# Patient Record
Sex: FEMALE | Race: WHITE | Employment: FULL TIME | URBAN - METROPOLITAN AREA
[De-identification: names, ages, dates, MRNs, and addresses within clinical notes are randomized per-mention and may not be internally consistent; named-entity substitution may affect disease eponyms.]

---

## 2024-08-31 ENCOUNTER — APPOINTMENT (OUTPATIENT)
Dept: GENERAL RADIOLOGY | Age: 49
DRG: 263 | End: 2024-08-31
Payer: MEDICAID

## 2024-08-31 ENCOUNTER — APPOINTMENT (OUTPATIENT)
Dept: CT IMAGING | Age: 49
DRG: 263 | End: 2024-08-31
Payer: MEDICAID

## 2024-08-31 ENCOUNTER — HOSPITAL ENCOUNTER (INPATIENT)
Age: 49
LOS: 7 days | Discharge: HOME | DRG: 263 | End: 2024-09-07
Attending: STUDENT IN AN ORGANIZED HEALTH CARE EDUCATION/TRAINING PROGRAM | Admitting: INTERNAL MEDICINE
Payer: MEDICAID

## 2024-08-31 ENCOUNTER — APPOINTMENT (OUTPATIENT)
Dept: ULTRASOUND IMAGING | Age: 49
DRG: 263 | End: 2024-08-31
Payer: MEDICAID
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
## 2024-08-31 DIAGNOSIS — K80.50 CHOLEDOCHOLITHIASIS: Primary | ICD-10-CM
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
## 2024-08-31 DIAGNOSIS — N17.9 AKI (ACUTE KIDNEY INJURY) (HCC): ICD-10-CM

## 2024-08-31 DIAGNOSIS — M62.82 NON-TRAUMATIC RHABDOMYOLYSIS: ICD-10-CM

## 2024-08-31 DIAGNOSIS — R74.01 TRANSAMINITIS: ICD-10-CM

## 2024-08-31 PROBLEM — E03.9 HYPOTHYROIDISM: Status: ACTIVE | Noted: 2024-08-31

## 2024-08-31 PROBLEM — R56.9 SEIZURE (HCC): Status: ACTIVE | Noted: 2024-08-31

## 2024-08-31 PROBLEM — R79.89 ELEVATED LFTS: Status: ACTIVE | Noted: 2024-08-31

## 2024-08-31 PROBLEM — R74.8 ELEVATED CK: Status: ACTIVE | Noted: 2024-08-31

## 2024-08-31 LAB
ALBUMIN SERPL-MCNC: 4.2 G/DL (ref 3.5–5.2)
ALP SERPL-CCNC: 264 U/L (ref 35–104)
ALT SERPL-CCNC: 264 U/L (ref 0–32)
AMMONIA PLAS-SCNC: 16 UMOL/L (ref 11–51)
AMPHET UR QL SCN: NEGATIVE
ANION GAP SERPL CALCULATED.3IONS-SCNC: 17 MMOL/L (ref 7–16)
APAP SERPL-MCNC: <5 UG/ML (ref 10–30)
AST SERPL-CCNC: 197 U/L (ref 0–31)
BACTERIA URNS QL MICRO: ABNORMAL
BARBITURATES UR QL SCN: NEGATIVE
BASOPHILS # BLD: 0.13 K/UL (ref 0–0.2)
BASOPHILS NFR BLD: 1 % (ref 0–2)
BENZODIAZ UR QL: NEGATIVE
BILIRUB SERPL-MCNC: 0.9 MG/DL (ref 0–1.2)
BILIRUB UR QL STRIP: ABNORMAL
BUN SERPL-MCNC: 21 MG/DL (ref 6–20)
BUPRENORPHINE UR QL: NEGATIVE
CALCIUM SERPL-MCNC: 10.4 MG/DL (ref 8.6–10.2)
CANNABINOIDS UR QL SCN: NEGATIVE
CHLORIDE SERPL-SCNC: 113 MMOL/L (ref 98–107)
CK SERPL-CCNC: ABNORMAL U/L (ref 20–180)
CLARITY UR: CLEAR
CO2 SERPL-SCNC: 18 MMOL/L (ref 22–29)
COCAINE UR QL SCN: NEGATIVE
COLOR UR: YELLOW
CREAT SERPL-MCNC: 1.9 MG/DL (ref 0.5–1)
CREAT UR-MCNC: 126.2 MG/DL (ref 29–226)
EOSINOPHIL # BLD: 0 K/UL (ref 0.05–0.5)
EOSINOPHILS RELATIVE PERCENT: 0 % (ref 0–6)
ERYTHROCYTE [DISTWIDTH] IN BLOOD BY AUTOMATED COUNT: 14.2 % (ref 11.5–15)
ETHANOLAMINE SERPL-MCNC: <10 MG/DL (ref 0–0.08)
FENTANYL UR QL: NEGATIVE
GFR, ESTIMATED: 33 ML/MIN/1.73M2
GLUCOSE SERPL-MCNC: 114 MG/DL (ref 74–99)
GLUCOSE UR STRIP-MCNC: NEGATIVE MG/DL
HCT VFR BLD AUTO: 38.3 % (ref 34–48)
HGB BLD-MCNC: 12.6 G/DL (ref 11.5–15.5)
HGB UR QL STRIP.AUTO: ABNORMAL
IMM GRANULOCYTES # BLD AUTO: 0.07 K/UL (ref 0–0.58)
IMM GRANULOCYTES NFR BLD: 0 % (ref 0–5)
INR PPP: 1.3
KETONES UR STRIP-MCNC: 15 MG/DL
LACTATE BLDV-SCNC: 1.1 MMOL/L (ref 0.5–2.2)
LEUKOCYTE ESTERASE UR QL STRIP: NEGATIVE
LYMPHOCYTES NFR BLD: 1.46 K/UL (ref 1.5–4)
LYMPHOCYTES RELATIVE PERCENT: 8 % (ref 20–42)
MAGNESIUM SERPL-MCNC: 2.9 MG/DL (ref 1.6–2.6)
MCH RBC QN AUTO: 31.3 PG (ref 26–35)
MCHC RBC AUTO-ENTMCNC: 32.9 G/DL (ref 32–34.5)
MCV RBC AUTO: 95 FL (ref 80–99.9)
METHADONE UR QL: NEGATIVE
MONOCYTES NFR BLD: 1.22 K/UL (ref 0.1–0.95)
MONOCYTES NFR BLD: 7 % (ref 2–12)
NEUTROPHILS NFR BLD: 84 % (ref 43–80)
NEUTS SEG NFR BLD: 15.44 K/UL (ref 1.8–7.3)
NITRITE UR QL STRIP: NEGATIVE
OPIATES UR QL SCN: NEGATIVE
OXYCODONE UR QL SCN: NEGATIVE
PARTIAL THROMBOPLASTIN TIME: 31 SEC (ref 24.5–35.1)
PCP UR QL SCN: NEGATIVE
PH UR STRIP: 6 [PH] (ref 5–9)
PLATELET # BLD AUTO: 289 K/UL (ref 130–450)
PMV BLD AUTO: 11.6 FL (ref 7–12)
POTASSIUM SERPL-SCNC: 3.8 MMOL/L (ref 3.5–5)
PROT SERPL-MCNC: 7.7 G/DL (ref 6.4–8.3)
PROT UR STRIP-MCNC: ABNORMAL MG/DL
PROTHROMBIN TIME: 13.8 SEC (ref 9.3–12.4)
RBC # BLD AUTO: 4.03 M/UL (ref 3.5–5.5)
RBC #/AREA URNS HPF: ABNORMAL /HPF
SALICYLATES SERPL-MCNC: 6.8 MG/DL (ref 0–30)
SODIUM SERPL-SCNC: 148 MMOL/L (ref 132–146)
SODIUM UR-SCNC: 47 MMOL/L
SP GR UR STRIP: >1.03 (ref 1–1.03)
TEST INFORMATION: NORMAL
TOXIC TRICYCLIC SC,BLOOD: NEGATIVE
TROPONIN I SERPL HS-MCNC: 19 NG/L (ref 0–9)
UROBILINOGEN UR STRIP-ACNC: 0.2 EU/DL (ref 0–1)
WBC #/AREA URNS HPF: ABNORMAL /HPF
WBC OTHER # BLD: 18.3 K/UL (ref 4.5–11.5)

## 2024-08-31 PROCEDURE — 2580000003 HC RX 258: Performed by: STUDENT IN AN ORGANIZED HEALTH CARE EDUCATION/TRAINING PROGRAM

## 2024-08-31 PROCEDURE — 6360000004 HC RX CONTRAST MEDICATION: Performed by: RADIOLOGY

## 2024-08-31 PROCEDURE — 74177 CT ABD & PELVIS W/CONTRAST: CPT

## 2024-08-31 PROCEDURE — 73090 X-RAY EXAM OF FOREARM: CPT

## 2024-08-31 PROCEDURE — 87040 BLOOD CULTURE FOR BACTERIA: CPT

## 2024-08-31 PROCEDURE — 70486 CT MAXILLOFACIAL W/O DYE: CPT

## 2024-08-31 PROCEDURE — 70450 CT HEAD/BRAIN W/O DYE: CPT

## 2024-08-31 PROCEDURE — 85730 THROMBOPLASTIN TIME PARTIAL: CPT

## 2024-08-31 PROCEDURE — 76705 ECHO EXAM OF ABDOMEN: CPT

## 2024-08-31 PROCEDURE — 99223 1ST HOSP IP/OBS HIGH 75: CPT | Performed by: INTERNAL MEDICINE

## 2024-08-31 PROCEDURE — 82140 ASSAY OF AMMONIA: CPT

## 2024-08-31 PROCEDURE — 6360000002 HC RX W HCPCS: Performed by: STUDENT IN AN ORGANIZED HEALTH CARE EDUCATION/TRAINING PROGRAM

## 2024-08-31 PROCEDURE — G0480 DRUG TEST DEF 1-7 CLASSES: HCPCS

## 2024-08-31 PROCEDURE — 83735 ASSAY OF MAGNESIUM: CPT

## 2024-08-31 PROCEDURE — 2580000003 HC RX 258

## 2024-08-31 PROCEDURE — 99285 EMERGENCY DEPT VISIT HI MDM: CPT

## 2024-08-31 PROCEDURE — 84300 ASSAY OF URINE SODIUM: CPT

## 2024-08-31 PROCEDURE — 73521 X-RAY EXAM HIPS BI 2 VIEWS: CPT

## 2024-08-31 PROCEDURE — 71275 CT ANGIOGRAPHY CHEST: CPT

## 2024-08-31 PROCEDURE — 87086 URINE CULTURE/COLONY COUNT: CPT

## 2024-08-31 PROCEDURE — 84484 ASSAY OF TROPONIN QUANT: CPT

## 2024-08-31 PROCEDURE — 80053 COMPREHEN METABOLIC PANEL: CPT

## 2024-08-31 PROCEDURE — 73562 X-RAY EXAM OF KNEE 3: CPT

## 2024-08-31 PROCEDURE — 96374 THER/PROPH/DIAG INJ IV PUSH: CPT

## 2024-08-31 PROCEDURE — 85610 PROTHROMBIN TIME: CPT

## 2024-08-31 PROCEDURE — 80143 DRUG ASSAY ACETAMINOPHEN: CPT

## 2024-08-31 PROCEDURE — 2060000000 HC ICU INTERMEDIATE R&B

## 2024-08-31 PROCEDURE — 81001 URINALYSIS AUTO W/SCOPE: CPT

## 2024-08-31 PROCEDURE — 73610 X-RAY EXAM OF ANKLE: CPT

## 2024-08-31 PROCEDURE — 80179 DRUG ASSAY SALICYLATE: CPT

## 2024-08-31 PROCEDURE — 73620 X-RAY EXAM OF FOOT: CPT

## 2024-08-31 PROCEDURE — 36415 COLL VENOUS BLD VENIPUNCTURE: CPT

## 2024-08-31 PROCEDURE — 82550 ASSAY OF CK (CPK): CPT

## 2024-08-31 PROCEDURE — 83605 ASSAY OF LACTIC ACID: CPT

## 2024-08-31 PROCEDURE — 85025 COMPLETE CBC W/AUTO DIFF WBC: CPT

## 2024-08-31 PROCEDURE — 72125 CT NECK SPINE W/O DYE: CPT

## 2024-08-31 PROCEDURE — 82570 ASSAY OF URINE CREATININE: CPT

## 2024-08-31 PROCEDURE — 80307 DRUG TEST PRSMV CHEM ANLYZR: CPT

## 2024-08-31 PROCEDURE — 73630 X-RAY EXAM OF FOOT: CPT

## 2024-08-31 RX ORDER — DOCUSATE SODIUM 100 MG/1
100 CAPSULE, LIQUID FILLED ORAL 2 TIMES DAILY
Status: DISCONTINUED | OUTPATIENT
Start: 2024-09-01 | End: 2024-09-07 | Stop reason: HOSPADM

## 2024-08-31 RX ORDER — HYDRALAZINE HYDROCHLORIDE 20 MG/ML
10 INJECTION INTRAMUSCULAR; INTRAVENOUS EVERY 6 HOURS PRN
Status: DISCONTINUED | OUTPATIENT
Start: 2024-08-31 | End: 2024-09-07 | Stop reason: HOSPADM

## 2024-08-31 RX ORDER — POTASSIUM CHLORIDE 7.45 MG/ML
10 INJECTION INTRAVENOUS PRN
Status: DISCONTINUED | OUTPATIENT
Start: 2024-08-31 | End: 2024-09-07 | Stop reason: HOSPADM

## 2024-08-31 RX ORDER — CALCIUM CARBONATE 500 MG/1
500 TABLET, CHEWABLE ORAL 3 TIMES DAILY PRN
Status: DISCONTINUED | OUTPATIENT
Start: 2024-08-31 | End: 2024-09-07 | Stop reason: HOSPADM

## 2024-08-31 RX ORDER — ACETAMINOPHEN 650 MG/1
650 SUPPOSITORY RECTAL EVERY 6 HOURS PRN
Status: DISCONTINUED | OUTPATIENT
Start: 2024-08-31 | End: 2024-09-07 | Stop reason: HOSPADM

## 2024-08-31 RX ORDER — MAGNESIUM SULFATE IN WATER 40 MG/ML
2000 INJECTION, SOLUTION INTRAVENOUS PRN
Status: DISCONTINUED | OUTPATIENT
Start: 2024-08-31 | End: 2024-09-07 | Stop reason: HOSPADM

## 2024-08-31 RX ORDER — ACETAMINOPHEN 325 MG/1
650 TABLET ORAL EVERY 6 HOURS PRN
Status: DISCONTINUED | OUTPATIENT
Start: 2024-08-31 | End: 2024-09-07 | Stop reason: HOSPADM

## 2024-08-31 RX ORDER — FENTANYL CITRATE 50 UG/ML
25 INJECTION, SOLUTION INTRAMUSCULAR; INTRAVENOUS ONCE
Status: COMPLETED | OUTPATIENT
Start: 2024-08-31 | End: 2024-09-01

## 2024-08-31 RX ORDER — PREGABALIN 50 MG/1
100 CAPSULE ORAL 2 TIMES DAILY
Status: DISCONTINUED | OUTPATIENT
Start: 2024-09-01 | End: 2024-09-07 | Stop reason: HOSPADM

## 2024-08-31 RX ORDER — SODIUM CHLORIDE 0.9 % (FLUSH) 0.9 %
5-40 SYRINGE (ML) INJECTION EVERY 12 HOURS SCHEDULED
Status: DISCONTINUED | OUTPATIENT
Start: 2024-08-31 | End: 2024-09-07 | Stop reason: HOSPADM

## 2024-08-31 RX ORDER — ENOXAPARIN SODIUM 100 MG/ML
40 INJECTION SUBCUTANEOUS DAILY
Status: DISCONTINUED | OUTPATIENT
Start: 2024-09-01 | End: 2024-09-05

## 2024-08-31 RX ORDER — LEVETIRACETAM 500 MG/5ML
1500 INJECTION, SOLUTION, CONCENTRATE INTRAVENOUS ONCE
Status: COMPLETED | OUTPATIENT
Start: 2024-08-31 | End: 2024-08-31

## 2024-08-31 RX ORDER — LEVETIRACETAM 500 MG/5ML
500 INJECTION, SOLUTION, CONCENTRATE INTRAVENOUS EVERY 12 HOURS
Status: DISCONTINUED | OUTPATIENT
Start: 2024-09-01 | End: 2024-09-07 | Stop reason: HOSPADM

## 2024-08-31 RX ORDER — BENZONATATE 100 MG/1
100 CAPSULE ORAL 3 TIMES DAILY PRN
Status: DISCONTINUED | OUTPATIENT
Start: 2024-08-31 | End: 2024-09-07 | Stop reason: HOSPADM

## 2024-08-31 RX ORDER — LEVOTHYROXINE SODIUM 25 UG/1
50 TABLET ORAL DAILY
Status: DISCONTINUED | OUTPATIENT
Start: 2024-09-01 | End: 2024-09-07 | Stop reason: HOSPADM

## 2024-08-31 RX ORDER — ONDANSETRON 2 MG/ML
4 INJECTION INTRAMUSCULAR; INTRAVENOUS EVERY 6 HOURS PRN
Status: DISCONTINUED | OUTPATIENT
Start: 2024-08-31 | End: 2024-09-07 | Stop reason: HOSPADM

## 2024-08-31 RX ORDER — LANOLIN ALCOHOL/MO/W.PET/CERES
3 CREAM (GRAM) TOPICAL NIGHTLY PRN
Status: DISCONTINUED | OUTPATIENT
Start: 2024-09-01 | End: 2024-08-31

## 2024-08-31 RX ORDER — CLONIDINE HYDROCHLORIDE 0.1 MG/1
0.2 TABLET ORAL NIGHTLY
Status: DISCONTINUED | OUTPATIENT
Start: 2024-09-01 | End: 2024-09-07 | Stop reason: HOSPADM

## 2024-08-31 RX ORDER — CLONIDINE HYDROCHLORIDE 0.1 MG/1
0.2 TABLET ORAL NIGHTLY
Status: DISCONTINUED | OUTPATIENT
Start: 2024-08-31 | End: 2024-08-31

## 2024-08-31 RX ORDER — BUPROPION HYDROCHLORIDE 100 MG/1
100 TABLET, EXTENDED RELEASE ORAL 2 TIMES DAILY
Status: DISCONTINUED | OUTPATIENT
Start: 2024-09-01 | End: 2024-09-06

## 2024-08-31 RX ORDER — 0.9 % SODIUM CHLORIDE 0.9 %
1000 INTRAVENOUS SOLUTION INTRAVENOUS ONCE
Status: COMPLETED | OUTPATIENT
Start: 2024-08-31 | End: 2024-08-31

## 2024-08-31 RX ORDER — POLYETHYLENE GLYCOL 3350 17 G/17G
17 POWDER, FOR SOLUTION ORAL DAILY
Status: DISCONTINUED | OUTPATIENT
Start: 2024-09-01 | End: 2024-09-07 | Stop reason: HOSPADM

## 2024-08-31 RX ORDER — SODIUM CHLORIDE 0.9 % (FLUSH) 0.9 %
5-40 SYRINGE (ML) INJECTION PRN
Status: DISCONTINUED | OUTPATIENT
Start: 2024-08-31 | End: 2024-09-07 | Stop reason: HOSPADM

## 2024-08-31 RX ORDER — SODIUM CHLORIDE 9 MG/ML
INJECTION, SOLUTION INTRAVENOUS PRN
Status: DISCONTINUED | OUTPATIENT
Start: 2024-08-31 | End: 2024-09-07 | Stop reason: HOSPADM

## 2024-08-31 RX ORDER — TOPIRAMATE 100 MG/1
200 TABLET, FILM COATED ORAL NIGHTLY
Status: DISCONTINUED | OUTPATIENT
Start: 2024-09-01 | End: 2024-09-01

## 2024-08-31 RX ORDER — ONDANSETRON 4 MG/1
4 TABLET, ORALLY DISINTEGRATING ORAL EVERY 8 HOURS PRN
Status: DISCONTINUED | OUTPATIENT
Start: 2024-08-31 | End: 2024-09-07 | Stop reason: HOSPADM

## 2024-08-31 RX ORDER — POLYETHYLENE GLYCOL 3350 17 G/17G
17 POWDER, FOR SOLUTION ORAL DAILY PRN
Status: DISCONTINUED | OUTPATIENT
Start: 2024-08-31 | End: 2024-09-05

## 2024-08-31 RX ORDER — SODIUM CHLORIDE, SODIUM LACTATE, POTASSIUM CHLORIDE, CALCIUM CHLORIDE 600; 310; 30; 20 MG/100ML; MG/100ML; MG/100ML; MG/100ML
INJECTION, SOLUTION INTRAVENOUS CONTINUOUS
Status: DISCONTINUED | OUTPATIENT
Start: 2024-08-31 | End: 2024-09-01

## 2024-08-31 RX ORDER — IOPAMIDOL 755 MG/ML
75 INJECTION, SOLUTION INTRAVASCULAR
Status: COMPLETED | OUTPATIENT
Start: 2024-08-31 | End: 2024-08-31

## 2024-08-31 RX ORDER — DULOXETIN HYDROCHLORIDE 30 MG/1
60 CAPSULE, DELAYED RELEASE ORAL DAILY
Status: DISCONTINUED | OUTPATIENT
Start: 2024-09-01 | End: 2024-09-07 | Stop reason: HOSPADM

## 2024-08-31 RX ORDER — POTASSIUM CHLORIDE 1500 MG/1
40 TABLET, EXTENDED RELEASE ORAL PRN
Status: DISCONTINUED | OUTPATIENT
Start: 2024-08-31 | End: 2024-09-07 | Stop reason: HOSPADM

## 2024-08-31 RX ADMIN — SODIUM CHLORIDE 1000 ML: 9 INJECTION, SOLUTION INTRAVENOUS at 19:32

## 2024-08-31 RX ADMIN — LEVETIRACETAM 1500 MG: 100 INJECTION INTRAVENOUS at 21:55

## 2024-08-31 RX ADMIN — SODIUM CHLORIDE, POTASSIUM CHLORIDE, SODIUM LACTATE AND CALCIUM CHLORIDE: 600; 310; 30; 20 INJECTION, SOLUTION INTRAVENOUS at 22:02

## 2024-08-31 RX ADMIN — IOPAMIDOL 75 ML: 755 INJECTION, SOLUTION INTRAVENOUS at 21:18

## 2024-08-31 NOTE — ED PROVIDER NOTES
[facility] IMCU/NEURO  EMERGENCY DEPARTMENT ENCOUNTER        Pt Name: Natasha Joe  MRN: [id number]  Birthdate 1975  Date of evaluation: 8/31/2024  Provider: Gertrude Khalil DO  PCP: No primary care provider on file.  Note Started: 6:55 PM EDT 8/31/24    CHIEF COMPLAINT       Chief Complaint   Patient presents with    Fall     Fall at home in shower. Found at 1800 by boyfriend. Multiple bruises. On legs and arms. Bruise on Lt eye. Pt ANO x 3 at this moment.        HISTORY OF PRESENT ILLNESS: 1 or more Elements   History From: Patient    Limitations to history : None  Social Determinants : None    Natasha Joe is a 49 y.o. female who presents for fall.  Patient was found by her boyfriend around 6:00 tonight.  She states that she is unsure whether she lost consciousness or had a seizure prior to falling.  She was found in the shower.  She is unsure whether she hit her head.  She is complaining of pain in the neck.  She denies any drug or alcohol use.  She does have history of seizures.  She is not on anything for seizures.  Per EMS she was having intermittent confusion.  Denies any fever, chills, n/v, headache, dizziness, vision changes, weakness, cp, palpitations, leg swelling/tenderness, sob, cough, abd pain, dysuria, hematuria, diarrhea, constipation, bloody stools.    Nursing Notes were all reviewed and agreed with or any disagreements were addressed in the HPI.    ROS:   Pertinent positives and negatives are stated within HPI, all other systems reviewed and are negative.      --------------------------------------------- PAST HISTORY ---------------------------------------------  Past Medical History:  has a past medical history of History of heart valve regurgitation and Seizure (HCC).    Past Surgical History:  has no past surgical history on file.    Social History:      Family History: family history is not on file.     The patient’s home medications have been reviewed.    Allergies: Patient  patient's labs and case and he stated to start her on LR at 175 cc an hour.  He states that he will put in orders for urine studies.  He also states that a Ruffin catheter would be indicated for monitoring urine output. [AD]   2154 Patient CT abdomen pelvis shows findings of a 7 mm common bile duct stone distally, correlated clinically she has no abdominal pain but given these findings and her elevated LFTs we will obtain a right upper quadrant ultrasound. [VG]   2202 Spoke with Dr. Vela who accepted the patient for admission [AD]   2216 Reevaluated patient, all of her compartments were soft to palpation and pulses were intact in all extremities. [AD]      ED Course User Index  [AD] Gertrude Khalil DO  [VG] Thi Loyd DO       Medications   lactated ringers IV soln infusion ( IntraVENous New Bag 8/31/24 2202)   buPROPion (WELLBUTRIN SR) extended release tablet 100 mg (has no administration in time range)   DULoxetine (CYMBALTA) extended release capsule 60 mg (has no administration in time range)   levothyroxine (SYNTHROID) tablet 50 mcg (has no administration in time range)   pregabalin (LYRICA) capsule 100 mg (has no administration in time range)   topiramate (TOPAMAX) tablet 200 mg (has no administration in time range)   levETIRAcetam (KEPPRA) injection 500 mg (has no administration in time range)   benzocaine-menthol (CEPACOL SORE THROAT) lozenge 1 lozenge (has no administration in time range)   benzonatate (TESSALON) capsule 100 mg (has no administration in time range)   calcium carbonate (TUMS) chewable tablet 500 mg (has no administration in time range)   hydrALAZINE (APRESOLINE) injection 10 mg (has no administration in time range)   Polyvinyl Alcohol-Povidone PF (REFRESH) 1.4-0.6 % ophthalmic solution 1 drop (has no administration in time range)   sodium chloride (OCEAN, BABY AYR) 0.65 % nasal spray 1 spray (has no administration in time range)   sodium chloride flush 0.9 % injection 5-40 mL (has

## 2024-08-31 NOTE — ED NOTES
Radiology Procedure Waiver   Name: Natasha Joe  : 1975  MRN: [id number]    Date:  24    Time: 6:54 PM EDT    Benefits of immediately proceeding with Radiology exam(s) without pre-testing outweigh the risks or are not indicated as specified below and therefore the following is/are being waived:    [x] Pregnancy test   [] Patients LMP on-time and regular.   [] Patient had Tubal Ligation or has other Contraception Device.   [] Patient  is Menopausal or Premenarcheal.    [] Patient had Full or Partial Hysterectomy.    [] Protocol for Iodine allergy    [] MRI Questionnaire     [x] BUN/Creatinine   [] Patient age w/no hx of renal dysfunction.   [] Patient on Dialysis.   [] Recent Normal Labs.  Electronically signed by Gertrude Khalil DO on 24 at 6:54 PM EDT

## 2024-09-01 PROBLEM — M62.82 RHABDOMYOLYSIS: Status: ACTIVE | Noted: 2024-09-01
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
## 2024-09-01 LAB
25(OH)D3 SERPL-MCNC: 34.9 NG/ML (ref 30–100)
ALBUMIN SERPL-MCNC: 3.7 G/DL (ref 3.5–5.2)
ALP SERPL-CCNC: 212 U/L (ref 35–104)
ALT SERPL-CCNC: 231 U/L (ref 0–32)
ANION GAP SERPL CALCULATED.3IONS-SCNC: 10 MMOL/L (ref 7–16)
ANION GAP SERPL CALCULATED.3IONS-SCNC: 15 MMOL/L (ref 7–16)
AST SERPL-CCNC: 240 U/L (ref 0–31)
BACTERIA URNS QL MICRO: ABNORMAL
BILIRUB SERPL-MCNC: 0.9 MG/DL (ref 0–1.2)
BILIRUB UR QL STRIP: ABNORMAL
BUN SERPL-MCNC: 21 MG/DL (ref 6–20)
BUN SERPL-MCNC: 22 MG/DL (ref 6–20)
CALCIUM SERPL-MCNC: 9.3 MG/DL (ref 8.6–10.2)
CALCIUM SERPL-MCNC: 9.6 MG/DL (ref 8.6–10.2)
CHLORIDE SERPL-SCNC: 114 MMOL/L (ref 98–107)
CHLORIDE SERPL-SCNC: 118 MMOL/L (ref 98–107)
CHLORIDE UR-SCNC: 74 MMOL/L
CHOLEST SERPL-MCNC: 182 MG/DL
CK SERPL-CCNC: ABNORMAL U/L (ref 20–180)
CLARITY UR: CLEAR
CO2 SERPL-SCNC: 17 MMOL/L (ref 22–29)
CO2 SERPL-SCNC: 21 MMOL/L (ref 22–29)
COLOR UR: YELLOW
CREAT SERPL-MCNC: 1.1 MG/DL (ref 0.5–1)
CREAT SERPL-MCNC: 1.4 MG/DL (ref 0.5–1)
ERYTHROCYTE [DISTWIDTH] IN BLOOD BY AUTOMATED COUNT: 14.2 % (ref 11.5–15)
GFR, ESTIMATED: 46 ML/MIN/1.73M2
GFR, ESTIMATED: 64 ML/MIN/1.73M2
GLUCOSE SERPL-MCNC: 169 MG/DL (ref 74–99)
GLUCOSE SERPL-MCNC: 91 MG/DL (ref 74–99)
GLUCOSE UR STRIP-MCNC: NEGATIVE MG/DL
HBA1C MFR BLD: 4.7 % (ref 4–5.6)
HCT VFR BLD AUTO: 34.3 % (ref 34–48)
HDLC SERPL-MCNC: 39 MG/DL
HGB BLD-MCNC: 11.4 G/DL (ref 11.5–15.5)
HGB UR QL STRIP.AUTO: ABNORMAL
KETONES UR STRIP-MCNC: 40 MG/DL
LDLC SERPL CALC-MCNC: 117 MG/DL
LEUKOCYTE ESTERASE UR QL STRIP: NEGATIVE
MAGNESIUM SERPL-MCNC: 2.5 MG/DL (ref 1.6–2.6)
MCH RBC QN AUTO: 32.1 PG (ref 26–35)
MCHC RBC AUTO-ENTMCNC: 33.2 G/DL (ref 32–34.5)
MCV RBC AUTO: 96.6 FL (ref 80–99.9)
NITRITE UR QL STRIP: NEGATIVE
PH UR STRIP: 6 [PH] (ref 5–9)
PHOSPHATE SERPL-MCNC: 3.1 MG/DL (ref 2.5–4.5)
PLATELET # BLD AUTO: 291 K/UL (ref 130–450)
PMV BLD AUTO: 11.9 FL (ref 7–12)
POTASSIUM SERPL-SCNC: 3.4 MMOL/L (ref 3.5–5)
POTASSIUM SERPL-SCNC: 3.5 MMOL/L (ref 3.5–5)
POTASSIUM, UR: 35.8 MMOL/L
PROLACTIN SERPL-MCNC: 28.07 NG/ML
PROT SERPL-MCNC: 6.6 G/DL (ref 6.4–8.3)
PROT UR STRIP-MCNC: ABNORMAL MG/DL
RBC # BLD AUTO: 3.55 M/UL (ref 3.5–5.5)
RBC #/AREA URNS HPF: ABNORMAL /HPF
SODIUM SERPL-SCNC: 145 MMOL/L (ref 132–146)
SODIUM SERPL-SCNC: 150 MMOL/L (ref 132–146)
SODIUM UR-SCNC: 60 MMOL/L
SP GR UR STRIP: >1.03 (ref 1–1.03)
TRIGL SERPL-MCNC: 129 MG/DL
TROPONIN I SERPL HS-MCNC: 16 NG/L (ref 0–9)
TSH SERPL DL<=0.05 MIU/L-ACNC: 0.71 UIU/ML (ref 0.27–4.2)
UROBILINOGEN UR STRIP-ACNC: 0.2 EU/DL (ref 0–1)
VLDLC SERPL CALC-MCNC: 26 MG/DL
WBC #/AREA URNS HPF: ABNORMAL /HPF
WBC OTHER # BLD: 15.2 K/UL (ref 4.5–11.5)

## 2024-09-01 PROCEDURE — 84146 ASSAY OF PROLACTIN: CPT

## 2024-09-01 PROCEDURE — 80053 COMPREHEN METABOLIC PANEL: CPT

## 2024-09-01 PROCEDURE — 6360000002 HC RX W HCPCS: Performed by: STUDENT IN AN ORGANIZED HEALTH CARE EDUCATION/TRAINING PROGRAM

## 2024-09-01 PROCEDURE — 82607 VITAMIN B-12: CPT

## 2024-09-01 PROCEDURE — 84439 ASSAY OF FREE THYROXINE: CPT

## 2024-09-01 PROCEDURE — 83874 ASSAY OF MYOGLOBIN: CPT

## 2024-09-01 PROCEDURE — 99233 SBSQ HOSP IP/OBS HIGH 50: CPT

## 2024-09-01 PROCEDURE — 82550 ASSAY OF CK (CPK): CPT

## 2024-09-01 PROCEDURE — 84481 FREE ASSAY (FT-3): CPT

## 2024-09-01 PROCEDURE — 87040 BLOOD CULTURE FOR BACTERIA: CPT

## 2024-09-01 PROCEDURE — 6370000000 HC RX 637 (ALT 250 FOR IP)

## 2024-09-01 PROCEDURE — 82436 ASSAY OF URINE CHLORIDE: CPT

## 2024-09-01 PROCEDURE — 84207 ASSAY OF VITAMIN B-6: CPT

## 2024-09-01 PROCEDURE — 83735 ASSAY OF MAGNESIUM: CPT

## 2024-09-01 PROCEDURE — 6370000000 HC RX 637 (ALT 250 FOR IP): Performed by: INTERNAL MEDICINE

## 2024-09-01 PROCEDURE — 84425 ASSAY OF VITAMIN B-1: CPT

## 2024-09-01 PROCEDURE — 84443 ASSAY THYROID STIM HORMONE: CPT

## 2024-09-01 PROCEDURE — 84300 ASSAY OF URINE SODIUM: CPT

## 2024-09-01 PROCEDURE — 82306 VITAMIN D 25 HYDROXY: CPT

## 2024-09-01 PROCEDURE — 2580000003 HC RX 258: Performed by: INTERNAL MEDICINE

## 2024-09-01 PROCEDURE — 85027 COMPLETE CBC AUTOMATED: CPT

## 2024-09-01 PROCEDURE — 84100 ASSAY OF PHOSPHORUS: CPT

## 2024-09-01 PROCEDURE — 80048 BASIC METABOLIC PNL TOTAL CA: CPT

## 2024-09-01 PROCEDURE — 81001 URINALYSIS AUTO W/SCOPE: CPT

## 2024-09-01 PROCEDURE — 83550 IRON BINDING TEST: CPT

## 2024-09-01 PROCEDURE — 86592 SYPHILIS TEST NON-TREP QUAL: CPT

## 2024-09-01 PROCEDURE — 51702 INSERT TEMP BLADDER CATH: CPT

## 2024-09-01 PROCEDURE — 80061 LIPID PANEL: CPT

## 2024-09-01 PROCEDURE — 6360000002 HC RX W HCPCS: Performed by: INTERNAL MEDICINE

## 2024-09-01 PROCEDURE — 83540 ASSAY OF IRON: CPT

## 2024-09-01 PROCEDURE — 80074 ACUTE HEPATITIS PANEL: CPT

## 2024-09-01 PROCEDURE — 83036 HEMOGLOBIN GLYCOSYLATED A1C: CPT

## 2024-09-01 PROCEDURE — 36415 COLL VENOUS BLD VENIPUNCTURE: CPT

## 2024-09-01 PROCEDURE — 2580000003 HC RX 258

## 2024-09-01 PROCEDURE — 87389 HIV-1 AG W/HIV-1&-2 AB AG IA: CPT

## 2024-09-01 PROCEDURE — 6360000002 HC RX W HCPCS

## 2024-09-01 PROCEDURE — 82746 ASSAY OF FOLIC ACID SERUM: CPT

## 2024-09-01 PROCEDURE — 84133 ASSAY OF URINE POTASSIUM: CPT

## 2024-09-01 PROCEDURE — 87086 URINE CULTURE/COLONY COUNT: CPT

## 2024-09-01 PROCEDURE — 2060000000 HC ICU INTERMEDIATE R&B

## 2024-09-01 RX ORDER — LORAZEPAM 2 MG/ML
1 INJECTION INTRAMUSCULAR ONCE
Status: COMPLETED | OUTPATIENT
Start: 2024-09-01 | End: 2024-09-01

## 2024-09-01 RX ORDER — POTASSIUM CHLORIDE 7.45 MG/ML
10 INJECTION INTRAVENOUS
Status: COMPLETED | OUTPATIENT
Start: 2024-09-01 | End: 2024-09-01

## 2024-09-01 RX ORDER — POTASSIUM CHLORIDE 7.45 MG/ML
10 INJECTION INTRAVENOUS
Status: DISCONTINUED | OUTPATIENT
Start: 2024-09-01 | End: 2024-09-01

## 2024-09-01 RX ORDER — SODIUM CHLORIDE, SODIUM LACTATE, POTASSIUM CHLORIDE, AND CALCIUM CHLORIDE .6; .31; .03; .02 G/100ML; G/100ML; G/100ML; G/100ML
500 INJECTION, SOLUTION INTRAVENOUS ONCE
Status: COMPLETED | OUTPATIENT
Start: 2024-09-01 | End: 2024-09-01

## 2024-09-01 RX ORDER — LORAZEPAM 1 MG/1
1 TABLET ORAL ONCE
Status: DISCONTINUED | OUTPATIENT
Start: 2024-09-01 | End: 2024-09-01

## 2024-09-01 RX ORDER — LORAZEPAM 2 MG/ML
1 INJECTION INTRAMUSCULAR EVERY 6 HOURS PRN
Status: DISCONTINUED | OUTPATIENT
Start: 2024-09-01 | End: 2024-09-07 | Stop reason: HOSPADM

## 2024-09-01 RX ORDER — DEXTROSE MONOHYDRATE AND SODIUM CHLORIDE 5; .45 G/100ML; G/100ML
INJECTION, SOLUTION INTRAVENOUS CONTINUOUS
Status: DISCONTINUED | OUTPATIENT
Start: 2024-09-01 | End: 2024-09-02

## 2024-09-01 RX ORDER — DIVALPROEX SODIUM 125 MG/1
125 CAPSULE, COATED PELLETS ORAL EVERY 8 HOURS SCHEDULED
Status: DISCONTINUED | OUTPATIENT
Start: 2024-09-01 | End: 2024-09-02

## 2024-09-01 RX ADMIN — TOPIRAMATE 200 MG: 100 TABLET, FILM COATED ORAL at 20:39

## 2024-09-01 RX ADMIN — DEXTROSE AND SODIUM CHLORIDE: 5; 450 INJECTION, SOLUTION INTRAVENOUS at 15:41

## 2024-09-01 RX ADMIN — BUPROPION HYDROCHLORIDE 100 MG: 100 TABLET, EXTENDED RELEASE ORAL at 20:39

## 2024-09-01 RX ADMIN — FENTANYL CITRATE 25 MCG: 50 INJECTION INTRAMUSCULAR; INTRAVENOUS at 00:37

## 2024-09-01 RX ADMIN — LORAZEPAM 1 MG: 2 INJECTION INTRAMUSCULAR; INTRAVENOUS at 04:13

## 2024-09-01 RX ADMIN — ACETAMINOPHEN 650 MG: 325 TABLET ORAL at 13:53

## 2024-09-01 RX ADMIN — DOCUSATE SODIUM 100 MG: 100 CAPSULE, LIQUID FILLED ORAL at 20:36

## 2024-09-01 RX ADMIN — SODIUM CHLORIDE, POTASSIUM CHLORIDE, SODIUM LACTATE AND CALCIUM CHLORIDE 500 ML: 600; 310; 30; 20 INJECTION, SOLUTION INTRAVENOUS at 11:56

## 2024-09-01 RX ADMIN — LEVETIRACETAM 500 MG: 100 INJECTION INTRAVENOUS at 20:35

## 2024-09-01 RX ADMIN — DIVALPROEX SODIUM 125 MG: 125 CAPSULE ORAL at 20:36

## 2024-09-01 RX ADMIN — CLONIDINE HYDROCHLORIDE 0.2 MG: 0.1 TABLET ORAL at 20:36

## 2024-09-01 RX ADMIN — ENOXAPARIN SODIUM 40 MG: 100 INJECTION SUBCUTANEOUS at 09:58

## 2024-09-01 RX ADMIN — PREGABALIN 100 MG: 50 CAPSULE ORAL at 13:52

## 2024-09-01 RX ADMIN — PREGABALIN 100 MG: 50 CAPSULE ORAL at 20:36

## 2024-09-01 RX ADMIN — POTASSIUM CHLORIDE 10 MEQ: 7.46 INJECTION, SOLUTION INTRAVENOUS at 12:46

## 2024-09-01 RX ADMIN — LEVETIRACETAM 500 MG: 100 INJECTION INTRAVENOUS at 08:37

## 2024-09-01 RX ADMIN — DIVALPROEX SODIUM 125 MG: 125 CAPSULE ORAL at 16:35

## 2024-09-01 RX ADMIN — SODIUM CHLORIDE, PRESERVATIVE FREE 10 ML: 5 INJECTION INTRAVENOUS at 20:36

## 2024-09-01 RX ADMIN — SODIUM CHLORIDE, PRESERVATIVE FREE 10 ML: 5 INJECTION INTRAVENOUS at 00:41

## 2024-09-01 RX ADMIN — POTASSIUM CHLORIDE 10 MEQ: 7.46 INJECTION, SOLUTION INTRAVENOUS at 11:55

## 2024-09-01 ASSESSMENT — PAIN SCALES - GENERAL
PAINLEVEL_OUTOF10: 3
PAINLEVEL_OUTOF10: 0
PAINLEVEL_OUTOF10: 8
PAINLEVEL_OUTOF10: 3

## 2024-09-01 ASSESSMENT — PAIN DESCRIPTION - LOCATION
LOCATION: GENERALIZED
LOCATION: BACK

## 2024-09-01 ASSESSMENT — LIFESTYLE VARIABLES
HOW OFTEN DO YOU HAVE A DRINK CONTAINING ALCOHOL: NEVER
HOW MANY STANDARD DRINKS CONTAINING ALCOHOL DO YOU HAVE ON A TYPICAL DAY: PATIENT DOES NOT DRINK

## 2024-09-01 ASSESSMENT — PAIN DESCRIPTION - FREQUENCY: FREQUENCY: CONTINUOUS

## 2024-09-01 ASSESSMENT — PAIN DESCRIPTION - DESCRIPTORS
DESCRIPTORS: ACHING;DISCOMFORT;NAGGING
DESCRIPTORS: ACHING

## 2024-09-01 ASSESSMENT — PAIN DESCRIPTION - ONSET: ONSET: ON-GOING

## 2024-09-01 ASSESSMENT — PAIN DESCRIPTION - PAIN TYPE: TYPE: ACUTE PAIN

## 2024-09-01 NOTE — PROGRESS NOTES
Patient was found pulling the phone outlet out of the wall.  Patient tried to get out of bed several times.  Patient not following commands, not redirectable.  Dr. Vela notified, orders placed.

## 2024-09-01 NOTE — PROGRESS NOTES
4 Eyes Skin Assessment     NAME:  Natasha Joe  YOB: 1975  MEDICAL RECORD NUMBER:  [id number]    The patient is being assessed for  Admission    I agree that at least one RN has performed a thorough Head to Toe Skin Assessment on the patient. ALL assessment sites listed below have been assessed.      Areas assessed by both nurses:    Head, Face, Ears, Shoulders, Back, Chest, Arms, Elbows, Hands, Sacrum. Buttock, Coccyx, Ischium, Legs. Feet and Heels, and Under Medical Devices         Does the Patient have a Wound? No noted wound(s)       Dickson Prevention initiated by RN: No  Wound Care Orders initiated by RN: No    Pressure Injury (Stage 3,4, Unstageable, DTI, NWPT, and Complex wounds) if present, place Wound referral order by RN under : No    New Ostomies, if present place, Ostomy referral order under : No     Nurse 1 eSignature: Electronically signed by Amanda Braxton RN on 9/1/24 at 1:19 AM EDT    **SHARE this note so that the co-signing nurse can place an eSignature**    Nurse 2 eSignature: Electronically signed by Diana Garcia RN on 9/1/24 at 1:20 AM EDT

## 2024-09-01 NOTE — CONSULTS
[facility]          Nephrology Consult Note    Patient's Name: Natasha Joe     Reason for Consult: SEBASTIAN, rhabdomyolysis     Chief Complaint: fall  History Obtained from: patient, electronic medical record, past medical history     History of Present Illness:    Natasha Joe is a 49 year old female, with past medical history of heart valve regurgitation and seizures, who presented to the ED on 8/31 s/p a fall at home and concern for seizures. Vital signs on 8/31 include temperature 98.3, RR 18, pulse 114, /88, 98% SPO2 on room air. Lab data on 8/31 includes sodium 148, potassium 3.8, chloride 113, CO2 18, BUN 21, creatinine 1.9, magnesium 2.9, lactic acid 1.1, calcium 10.4, CK 10,288, albumin 4.2, WBC 18.3, Hgb 12.6. Imaging relevant to this note from 8/31 includes a CTA of the chest, which showed no evidence of pulmonary embolism or acute pulmonary abnormality and no evidence of trauma, CT spine wo contrast, which showed no acute fracture of traumatic malalignment of the cervical spine, CT face wo contrast, which showed no acute facial fractures and bifrontal scalp hematomas, CT head wo contrast, which showed no acute intracranial abnormality and bifrontal scalp hematomas, and CT abdomen/pelvis, which showed distended gallbladder with gallbladder wall thickening and 7 mm gallstone versus polyp, intra and extra biliary duct dilatation with 7 mm obstructing calculus in the distal common bile duct, diffuse colonic fecal retention with significant stool burden, no evidence of acute trauma.  Nephrology was consulted to see the patient for SEBASTIAN and rhabdomyolysis. The patient is not known to our service.  At present, patient was seen and examined. She is lethargic, responds somewhat to verbal stimuli but is unable to answer any questions appropriately. Therefore, a review of systems was not obtained.     PMH:    Past Medical History:   Diagnosis Date    History of heart valve regurgitation     Seizure  -- -- -- -- --   08/31/24 1930 (!) 138/90 -- -- (!) 103 14 97 % -- --   08/31/24 1849 -- -- -- -- -- 98 % -- --   08/31/24 1845 134/88 -- -- (!) 114 18 -- -- --         Intake/Output Summary (Last 24 hours) at 9/1/2024 1020  Last data filed at 9/1/2024 0530  Gross per 24 hour   Intake 1000 ml   Output 350 ml   Net 650 ml       Constitutional: Lethargic, in no acute distress   Neck: No jvd  Cardiovascular: Regular rate and rhythm, no murmurs, gallops, or rubs   Respiratory: Clear upper lobes, diminished bases bilaterally no rales, rhonchi, or wheezes  Gastrointestinal: Soft, nontender, nondistended. Active bowel sounds  Ext: No edema; ecchymosis noted throughout  Neuro: Lethargic, responds to verbal stimuli but unable to answer questions appropriately  Skin: Warm and dry, no rash     Data:    Recent Labs     08/31/24 1900 09/01/24  0505   WBC 18.3* 15.2*   HGB 12.6 11.4*   HCT 38.3 34.3   MCV 95.0 96.6    291       Recent Labs     08/31/24 1900 09/01/24  0505   * 150*   K 3.8 3.4*   * 118*   CO2 18* 17*   CREATININE 1.9* 1.4*   BUN 21* 21*   LABGLOM 33* 46*   GLUCOSE 114* 91   CALCIUM 10.4* 9.6   PHOS  --  3.1   MG 2.9* 2.5       No results found for: \"VITD25\"    No results found for: \"PTH\"    Recent Labs     08/31/24 1900 09/01/24  0505   * 231*   * 240*   ALKPHOS 264* 212*   BILITOT 0.9 0.9       No results for input(s): \"LABALBU\" in the last 72 hours.    No results found for: \"FERRITIN\", \"IRON\", \"TIBC\"    No results found for: \"QUMFXGAP24\"    No results found for: \"FOLATE\"      Lab Results   Component Value Date/Time    COLORU Yellow 08/31/2024 07:00 PM    NITRU NEGATIVE 08/31/2024 07:00 PM    GLUCOSEU NEGATIVE 08/31/2024 07:00 PM    KETUA 15 08/31/2024 07:00 PM    UROBILINOGEN 0.2 08/31/2024 07:00 PM    BILIRUBINUR SMALL 08/31/2024 07:00 PM       Lab Results   Component Value Date/Time    PROTEIN 6.6 09/01/2024 05:05 AM       No components found for: \"URIC\"    No results found

## 2024-09-01 NOTE — PROGRESS NOTES
Neurology consult sent to Dr. Brown and Nephrology consult sent to Dr. Elaine. Dr. Elaine notified of CMP results.

## 2024-09-01 NOTE — PROGRESS NOTES
Progress Note  Date:2024       Room:8505/8505-B  Patient Name:Natasha Joe     YOB: 1975     Age:49 y.o.    Natasha Joe is a 49 y.o. female who presents to [facility] ER complaining of fall.     Natasha Joe has a past medical history that includes seizure, DDD     Natasha presents to ER with a fall.  She was found by her boyfriend in the shower with multiple bruises.  Unsure if she lost consciousness or had a seizure prior to falling.  She is unsure whether she hit her head.  She was complaining of neck pain.  She has extensive bruising. Denies drug or alcohol use.  She does have a history of previous seizures but is not on any medication for seizures.  Per EMS she was having intermittent confusion.  In the ER, she was found to have SEBASTIAN along with elevated CK at 10,288  Discussed patient's case with ED physician.     ER Course  Upon presentation to the ER, routine labwork was performed which revealed sodium 148, chloride of 113, CO2 18, BUN 21, creatinine 1.9, anion gap 17, CK 10,288, troponin 19, alk phos 264, , .  Imaging results are as outlined below in the Imaging section of this note. Upon arrival to the ER, patient was 134/88, tachycardic 114.  The patient received 1 L normal saline, LR in the emergency room and was admitted to [facility].      Objective         Vitals Last 24 Hours:  TEMPERATURE:  Temp  Av.1 °F (36.7 °C)  Min: 98 °F (36.7 °C)  Max: 98.3 °F (36.8 °C)  RESPIRATIONS RANGE: Resp  Av.8  Min: 14  Max: 24  PULSE OXIMETRY RANGE: SpO2  Av.3 %  Min: 96 %  Max: 98 %  PULSE RANGE: Pulse  Av.5  Min: 103  Max: 114  BLOOD PRESSURE RANGE: Systolic (24hrs), Av , Min:124 , Max:138   ; Diastolic (24hrs), Av, Min:73, Max:92    I/O (24Hr):    Intake/Output Summary (Last 24 hours) at 2024 8448  Last data filed at 2024 0530  Gross per 24 hour   Intake 1000 ml   Output 350 ml   Net 650 ml     Objective  Labs/Imaging/Diagnostics   EDT

## 2024-09-01 NOTE — PLAN OF CARE
Problem: Confusion  Goal: Confusion, delirium, dementia, or psychosis is improved or at baseline  Description: INTERVENTIONS:  1. Assess for possible contributors to thought disturbance, including medications, impaired vision or hearing, underlying metabolic abnormalities, dehydration, psychiatric diagnoses, and notify attending LIP  2. [place] high risk fall precautions, as indicated  3. Provide frequent short contacts to provide reality reorientation, refocusing and direction  4. Decrease environmental stimuli, including noise as appropriate  5. Monitor and intervene to maintain adequate nutrition, hydration, elimination, sleep and activity  6. If unable to ensure safety without constant attention obtain sitter and review sitter guidelines with assigned personnel  7. Initiate Psychosocial CNS and Spiritual Care consult, as indicated  Outcome: Progressing

## 2024-09-01 NOTE — PROGRESS NOTES
RN entered the room and found patient pulling haney.  Patient educated on the purpose the haney and the risk of infection touching haney that goes in the body.  Will continue to monitor.

## 2024-09-01 NOTE — H&P
Inpatient H&P      PCP:  No primary care provider on file.  Admitting Physician:  Neyda Vela DO  Consultants:  Nephrology  Chief Complaint:    Chief Complaint   Patient presents with    Fall     Fall at home in shower. Found at 1800 by boyfriend. Multiple bruises. On legs and arms. Bruise on Lt eye. Pt ANO x 3 at this moment.        History of Present Illness  Natasha Joe is a 49 y.o. female who presents to [facility] ER complaining of fall.    Natasha Joe has a past medical history that includes seizure, DDD    Natasha presents to ER with a fall.  She was found by her boyfriend in the shower with multiple bruises.  Unsure if she lost consciousness or had a seizure prior to falling.  She is unsure whether she hit her head.  She was complaining of neck pain.  She has extensive bruising. Denies drug or alcohol use.  She does have a history of previous seizures but is not on any medication for seizures.  Per EMS she was having intermittent confusion.  In the ER, she was found to have SEBASTIAN along with elevated CK at 10,288  Discussed patient's case with ED physician.    ER Course  Upon presentation to the ER, routine labwork was performed which revealed sodium 148, chloride of 113, CO2 18, BUN 21, creatinine 1.9, anion gap 17, CK 10,288, troponin 19, alk phos 264, , .  Imaging results are as outlined below in the Imaging section of this note. Upon arrival to the ER, patient was 134/88, tachycardic 114.  The patient received 1 L normal saline, LR in the emergency room and was admitted to [facility].    Last Hospital Admission -   None in EMR    Last Echocardiogram -   None in EMR     ED TRIAGE VITALS  BP: (!) 127/92, Temp: 98.3 °F (36.8 °C), Pulse: (!) 109, Respirations: 24, SpO2: 97 %    Vitals:    08/31/24 1930 08/31/24 1951 08/31/24 2030 08/31/24 2155   BP: (!) 138/90  137/87 (!) 127/92   Pulse: (!) 103  (!) 103 (!) 109   Resp: 14  14 24   Temp:  98.3 °F (36.8 °C)     TempSrc:  Oral    Absolute 1.46 (L) 1.50 - 4.00 k/uL    Monocytes Absolute 1.22 (H) 0.10 - 0.95 k/uL    Eosinophils Absolute 0.00 (L) 0.05 - 0.50 k/uL    Basophils Absolute 0.13 0.00 - 0.20 k/uL    Immature Granulocytes Absolute 0.07 0.00 - 0.58 k/uL   CMP    Collection Time: 08/31/24  7:00 PM   Result Value Ref Range    Sodium 148 (H) 132 - 146 mmol/L    Potassium 3.8 3.5 - 5.0 mmol/L    Chloride 113 (H) 98 - 107 mmol/L    CO2 18 (L) 22 - 29 mmol/L    Anion Gap 17 (H) 7 - 16 mmol/L    Glucose 114 (H) 74 - 99 mg/dL    BUN 21 (H) 6 - 20 mg/dL    Creatinine 1.9 (H) 0.50 - 1.00 mg/dL    Est, Glom Filt Rate 33 (L) >60 mL/min/1.73m2    Calcium 10.4 (H) 8.6 - 10.2 mg/dL    Total Protein 7.7 6.4 - 8.3 g/dL    Albumin 4.2 3.5 - 5.2 g/dL    Total Bilirubin 0.9 0.0 - 1.2 mg/dL    Alkaline Phosphatase 264 (H) 35 - 104 U/L     (H) 0 - 32 U/L     (H) 0 - 31 U/L   Magnesium    Collection Time: 08/31/24  7:00 PM   Result Value Ref Range    Magnesium 2.9 (H) 1.6 - 2.6 mg/dL   Lactic Acid    Collection Time: 08/31/24  7:00 PM   Result Value Ref Range    Lactic Acid 1.1 0.5 - 2.2 mmol/L   CK    Collection Time: 08/31/24  7:00 PM   Result Value Ref Range    Total CK 10,288 (H) 20 - 180 U/L   Troponin    Collection Time: 08/31/24  7:00 PM   Result Value Ref Range    Troponin, High Sensitivity 19 (H) 0 - 9 ng/L   SERUM DRUG SCREEN    Collection Time: 08/31/24  7:00 PM   Result Value Ref Range    Acetaminophen Level <5 (L) 10.0 - 30.0 ug/mL    Ethanol Lvl <10 <10 mg/dL    Salicylate Lvl 6.8 0.0 - 30.0 mg/dL    Toxic Tricyclic Sc,Blood NEGATIVE NEGATIVE   Culture, Blood 1    Collection Time: 08/31/24  7:00 PM    Specimen: Blood   Result Value Ref Range    Specimen Description .BLOOD     Special Requests          Culture NO GROWTH <24 HRS    Culture, Blood 2    Collection Time: 08/31/24  7:00 PM    Specimen: Blood   Result Value Ref Range    Specimen Description .BLOOD     Special Requests Site: Blood     Culture NO GROWTH <24 HRS    APTT     HISTORY: ORDERING SYSTEM PROVIDED HISTORY: bruising, injury TECHNOLOGIST PROVIDED HISTORY: Reason for exam:->bruising, injury FINDINGS: Medial compartment joint space narrowing with mild tricompartment osteophyte formation.  No fracture or traumatic malalignment.  Normal mineralization. Possible trace knee effusion.     1. No fracture or traumatic malalignment. 2. Mild tricompartment osteoarthritis. 3. Possible trace knee effusion.     XR ANKLE RIGHT (MIN 3 VIEWS)    Result Date: 8/31/2024  EXAMINATION: THREE XRAY VIEWS OF THE RIGHT ANKLE 8/31/2024 8:23 pm COMPARISON: None. HISTORY: ORDERING SYSTEM PROVIDED HISTORY: injury, dorsal bruising TECHNOLOGIST PROVIDED HISTORY: Reason for exam:->injury, dorsal bruising FINDINGS: Normal mineralization.  No traumatic malalignment.  Likely old injury to the medial malleolus.  No acute fracture.  Joint spaces are preserved.  Minimal ankle soft tissue swelling.     1. No fracture or traumatic malalignment.     XR FOOT RIGHT (2 VIEWS)    Result Date: 8/31/2024  EXAMINATION: TWO XRAY VIEWS OF THE RIGHT FOOT 8/31/2024 8:23 pm COMPARISON: None. HISTORY: ORDERING SYSTEM PROVIDED HISTORY: injury, dorsal bruising TECHNOLOGIST PROVIDED HISTORY: Reason for exam:->injury, dorsal bruising FINDINGS: Normal mineralization.  No traumatic malalignment.  No fracture.  Joint spaces are preserved.     No acute osseous abnormality.  If there is concern for midfoot/ligamentous Lisfranc injury, recommend weight-bearing films.     XR HIP BILATERAL W AP PELVIS (2 VIEWS)    Result Date: 8/31/2024  EXAMINATION: ONE XRAY VIEW OF THE PELVIS AND TWO XRAY VIEWS OF EACH OF THE BILATERAL HIPS 8/31/2024 8:23 pm COMPARISON: None. HISTORY: ORDERING SYSTEM PROVIDED HISTORY: fall TECHNOLOGIST PROVIDED HISTORY: Reason for exam:->fall FINDINGS: There is no evidence of acute fracture.  There is normal alignment.  No acute joint abnormality.  No focal osseous lesion. No focal soft tissue abnormality.     No acute

## 2024-09-02 ENCOUNTER — APPOINTMENT (OUTPATIENT)
Dept: MRI IMAGING | Age: 49
DRG: 263 | End: 2024-09-02
Payer: MEDICAID

## 2024-09-02 LAB
ALBUMIN SERPL-MCNC: 2.8 G/DL (ref 3.5–5.2)
ALP SERPL-CCNC: 134 U/L (ref 35–104)
ALT SERPL-CCNC: 135 U/L (ref 0–32)
ANION GAP SERPL CALCULATED.3IONS-SCNC: 10 MMOL/L (ref 7–16)
AST SERPL-CCNC: 128 U/L (ref 0–31)
BILIRUB DIRECT SERPL-MCNC: 0.2 MG/DL (ref 0–0.3)
BILIRUB INDIRECT SERPL-MCNC: 0.3 MG/DL (ref 0–1)
BILIRUB SERPL-MCNC: 0.5 MG/DL (ref 0–1.2)
BUN SERPL-MCNC: 20 MG/DL (ref 6–20)
CALCIUM SERPL-MCNC: 8.6 MG/DL (ref 8.6–10.2)
CHLORIDE SERPL-SCNC: 113 MMOL/L (ref 98–107)
CK SERPL-CCNC: 9117 U/L (ref 20–180)
CO2 SERPL-SCNC: 20 MMOL/L (ref 22–29)
CREAT SERPL-MCNC: 1 MG/DL (ref 0.5–1)
ERYTHROCYTE [DISTWIDTH] IN BLOOD BY AUTOMATED COUNT: 14.3 % (ref 11.5–15)
FOLATE SERPL-MCNC: >20 NG/ML (ref 4.8–24.2)
GFR, ESTIMATED: 73 ML/MIN/1.73M2
GLUCOSE SERPL-MCNC: 115 MG/DL (ref 74–99)
HCT VFR BLD AUTO: 28.6 % (ref 34–48)
HGB BLD-MCNC: 9.2 G/DL (ref 11.5–15.5)
IRON SATN MFR SERPL: 17 % (ref 15–50)
IRON SERPL-MCNC: 41 UG/DL (ref 37–145)
MAGNESIUM SERPL-MCNC: 1.8 MG/DL (ref 1.6–2.6)
MCH RBC QN AUTO: 30.6 PG (ref 26–35)
MCHC RBC AUTO-ENTMCNC: 32.2 G/DL (ref 32–34.5)
MCV RBC AUTO: 95 FL (ref 80–99.9)
MICROORGANISM SPEC CULT: ABNORMAL
MICROORGANISM SPEC CULT: ABNORMAL
PHOSPHATE SERPL-MCNC: 2 MG/DL (ref 2.5–4.5)
PLATELET # BLD AUTO: 276 K/UL (ref 130–450)
PMV BLD AUTO: 11.3 FL (ref 7–12)
POTASSIUM SERPL-SCNC: 3.5 MMOL/L (ref 3.5–5)
PROT SERPL-MCNC: 5.2 G/DL (ref 6.4–8.3)
RBC # BLD AUTO: 3.01 M/UL (ref 3.5–5.5)
SERVICE CMNT-IMP: ABNORMAL
SODIUM SERPL-SCNC: 143 MMOL/L (ref 132–146)
SPECIMEN DESCRIPTION: ABNORMAL
T3FREE SERPL-MCNC: 1.57 PG/ML (ref 2–4.4)
TIBC SERPL-MCNC: 239 UG/DL (ref 250–450)
VIT B12 SERPL-MCNC: 288 PG/ML (ref 211–946)
WBC OTHER # BLD: 10.1 K/UL (ref 4.5–11.5)

## 2024-09-02 PROCEDURE — 6360000002 HC RX W HCPCS: Performed by: INTERNAL MEDICINE

## 2024-09-02 PROCEDURE — 2580000003 HC RX 258: Performed by: INTERNAL MEDICINE

## 2024-09-02 PROCEDURE — 36415 COLL VENOUS BLD VENIPUNCTURE: CPT

## 2024-09-02 PROCEDURE — 99222 1ST HOSP IP/OBS MODERATE 55: CPT | Performed by: STUDENT IN AN ORGANIZED HEALTH CARE EDUCATION/TRAINING PROGRAM

## 2024-09-02 PROCEDURE — 99232 SBSQ HOSP IP/OBS MODERATE 35: CPT

## 2024-09-02 PROCEDURE — 82085 ASSAY OF ALDOLASE: CPT

## 2024-09-02 PROCEDURE — 2060000000 HC ICU INTERMEDIATE R&B

## 2024-09-02 PROCEDURE — 82248 BILIRUBIN DIRECT: CPT

## 2024-09-02 PROCEDURE — 6360000002 HC RX W HCPCS: Performed by: PSYCHIATRY & NEUROLOGY

## 2024-09-02 PROCEDURE — 6360000002 HC RX W HCPCS

## 2024-09-02 PROCEDURE — 2580000003 HC RX 258

## 2024-09-02 PROCEDURE — 6370000000 HC RX 637 (ALT 250 FOR IP)

## 2024-09-02 PROCEDURE — 6370000000 HC RX 637 (ALT 250 FOR IP): Performed by: INTERNAL MEDICINE

## 2024-09-02 PROCEDURE — 82550 ASSAY OF CK (CPK): CPT

## 2024-09-02 PROCEDURE — 83735 ASSAY OF MAGNESIUM: CPT

## 2024-09-02 PROCEDURE — 80053 COMPREHEN METABOLIC PANEL: CPT

## 2024-09-02 PROCEDURE — 6360000004 HC RX CONTRAST MEDICATION: Performed by: RADIOLOGY

## 2024-09-02 PROCEDURE — 99233 SBSQ HOSP IP/OBS HIGH 50: CPT

## 2024-09-02 PROCEDURE — 70543 MRI ORBT/FAC/NCK W/O &W/DYE: CPT

## 2024-09-02 PROCEDURE — 84100 ASSAY OF PHOSPHORUS: CPT

## 2024-09-02 PROCEDURE — A9579 GAD-BASE MR CONTRAST NOS,1ML: HCPCS | Performed by: RADIOLOGY

## 2024-09-02 PROCEDURE — 85027 COMPLETE CBC AUTOMATED: CPT

## 2024-09-02 PROCEDURE — 2580000003 HC RX 258: Performed by: STUDENT IN AN ORGANIZED HEALTH CARE EDUCATION/TRAINING PROGRAM

## 2024-09-02 PROCEDURE — 70553 MRI BRAIN STEM W/O & W/DYE: CPT

## 2024-09-02 RX ORDER — NICOTINE 21 MG/24HR
1 PATCH, TRANSDERMAL 24 HOURS TRANSDERMAL DAILY
Status: DISCONTINUED | OUTPATIENT
Start: 2024-09-02 | End: 2024-09-07 | Stop reason: HOSPADM

## 2024-09-02 RX ORDER — SODIUM CHLORIDE, SODIUM LACTATE, POTASSIUM CHLORIDE, CALCIUM CHLORIDE 600; 310; 30; 20 MG/100ML; MG/100ML; MG/100ML; MG/100ML
INJECTION, SOLUTION INTRAVENOUS CONTINUOUS
Status: DISCONTINUED | OUTPATIENT
Start: 2024-09-02 | End: 2024-09-05

## 2024-09-02 RX ORDER — TOPIRAMATE 100 MG/1
200 TABLET, FILM COATED ORAL NIGHTLY
Status: DISCONTINUED | OUTPATIENT
Start: 2024-09-02 | End: 2024-09-07 | Stop reason: HOSPADM

## 2024-09-02 RX ORDER — TRAMADOL HYDROCHLORIDE 50 MG/1
50 TABLET ORAL EVERY 6 HOURS PRN
Status: DISCONTINUED | OUTPATIENT
Start: 2024-09-02 | End: 2024-09-07 | Stop reason: HOSPADM

## 2024-09-02 RX ADMIN — BUPROPION HYDROCHLORIDE 100 MG: 100 TABLET, EXTENDED RELEASE ORAL at 09:47

## 2024-09-02 RX ADMIN — POLYETHYLENE GLYCOL 3350 17 G: 17 POWDER, FOR SOLUTION ORAL at 09:48

## 2024-09-02 RX ADMIN — ACETAMINOPHEN 650 MG: 325 TABLET ORAL at 09:46

## 2024-09-02 RX ADMIN — PIPERACILLIN AND TAZOBACTAM 3375 MG: 3; .375 INJECTION, POWDER, LYOPHILIZED, FOR SOLUTION INTRAVENOUS at 20:30

## 2024-09-02 RX ADMIN — DOCUSATE SODIUM 100 MG: 100 CAPSULE, LIQUID FILLED ORAL at 20:31

## 2024-09-02 RX ADMIN — LEVETIRACETAM 500 MG: 100 INJECTION INTRAVENOUS at 20:31

## 2024-09-02 RX ADMIN — PIPERACILLIN AND TAZOBACTAM 4500 MG: 4; .5 INJECTION, POWDER, FOR SOLUTION INTRAVENOUS at 16:06

## 2024-09-02 RX ADMIN — BUPROPION HYDROCHLORIDE 100 MG: 100 TABLET, EXTENDED RELEASE ORAL at 20:32

## 2024-09-02 RX ADMIN — DOCUSATE SODIUM 100 MG: 100 CAPSULE, LIQUID FILLED ORAL at 09:48

## 2024-09-02 RX ADMIN — LEVOTHYROXINE SODIUM 50 MCG: 0.03 TABLET ORAL at 05:32

## 2024-09-02 RX ADMIN — LORAZEPAM 1 MG: 2 INJECTION INTRAMUSCULAR; INTRAVENOUS at 14:40

## 2024-09-02 RX ADMIN — PREGABALIN 100 MG: 50 CAPSULE ORAL at 09:48

## 2024-09-02 RX ADMIN — CLONIDINE HYDROCHLORIDE 0.2 MG: 0.1 TABLET ORAL at 20:32

## 2024-09-02 RX ADMIN — ENOXAPARIN SODIUM 40 MG: 100 INJECTION SUBCUTANEOUS at 09:48

## 2024-09-02 RX ADMIN — SODIUM CHLORIDE, PRESERVATIVE FREE 10 ML: 5 INJECTION INTRAVENOUS at 20:31

## 2024-09-02 RX ADMIN — DIVALPROEX SODIUM 125 MG: 125 CAPSULE ORAL at 05:32

## 2024-09-02 RX ADMIN — LEVETIRACETAM 500 MG: 100 INJECTION INTRAVENOUS at 09:48

## 2024-09-02 RX ADMIN — TOPIRAMATE 200 MG: 100 TABLET, FILM COATED ORAL at 20:31

## 2024-09-02 RX ADMIN — TRAMADOL HYDROCHLORIDE 50 MG: 50 TABLET ORAL at 12:41

## 2024-09-02 RX ADMIN — PREGABALIN 100 MG: 50 CAPSULE ORAL at 20:34

## 2024-09-02 RX ADMIN — DULOXETINE HYDROCHLORIDE 60 MG: 30 CAPSULE, DELAYED RELEASE ORAL at 09:47

## 2024-09-02 RX ADMIN — GADOTERIDOL 15 ML: 279.3 INJECTION, SOLUTION INTRAVENOUS at 15:59

## 2024-09-02 RX ADMIN — SODIUM CHLORIDE, POTASSIUM CHLORIDE, SODIUM LACTATE AND CALCIUM CHLORIDE: 600; 310; 30; 20 INJECTION, SOLUTION INTRAVENOUS at 16:11

## 2024-09-02 ASSESSMENT — PAIN DESCRIPTION - LOCATION
LOCATION: HEAD;GENERALIZED
LOCATION: GENERALIZED

## 2024-09-02 ASSESSMENT — PAIN DESCRIPTION - DESCRIPTORS
DESCRIPTORS: ACHING;DISCOMFORT;SORE
DESCRIPTORS: ACHING;DISCOMFORT;SORE

## 2024-09-02 ASSESSMENT — PAIN SCALES - GENERAL
PAINLEVEL_OUTOF10: 6
PAINLEVEL_OUTOF10: 9

## 2024-09-02 ASSESSMENT — PAIN - FUNCTIONAL ASSESSMENT: PAIN_FUNCTIONAL_ASSESSMENT: ACTIVITIES ARE NOT PREVENTED

## 2024-09-02 ASSESSMENT — PAIN DESCRIPTION - ORIENTATION
ORIENTATION: RIGHT;LEFT;LOWER;UPPER;MID
ORIENTATION: RIGHT;LEFT;LOWER;UPPER

## 2024-09-02 NOTE — PROGRESS NOTES
Natasha Joe is a 49 y.o. right handed female     Neurology following for possible seizure    PMH of seizures and heart valve regurgitation    Assessment:     PNES  History of nonepileptic seizures, follows with [facility]  EMU admission displayed for captured events which were nonepileptic    History of migraines  Maintained on Topamax 200 mg nightly and Nurtec    Plan:     Stop Depakote  Continue  Keppra for now  Restart Topamax 200 mg nightly  EEG  B12, B1, B6 pending  MRI brain  Neurology will follow  Continue seizure precautions for 6 months from date of last seizure. These include, but are not limited to:   No driving  No riding bicycles in traffic  No operating dangerous/heavy machinery  No engaging in activities at dangerous heights including using ladders  No taking baths unattended  No swimming  No engaging in activities near fire unattended  Recommend caution or avoidance of cooking or using potentially dangerous objects such as knives.  Avoid any activities where sudden loss of consciousness could result in injury to yourself or others    History of Present Illness:     Patient presented to the ER 8/31/2024 after experiencing a fall in the shower.  Patient was found by her boyfriend in the shower and was unclear whether she lost consciousness or had a seizure prior to falling.  She is unsure if she hit her head but was complaining of neck pain.  She was postictal while in the ER.  She had multiple bruises all over her body and around her eyes.    She has a history of nonepileptic seizures and is not prescribed any medications for this.  She follows with [facility].  She had an EMU admission which 4 events were captured and none of them are typical.  Notes from [facility] stated \" She has a fair insight to nonepileptic events and understands that events captured during admission were not seizures\".  She was taking lamotrigine 200 mg daily for mood and Topamax 200 mg for both headaches  Bifrontal scalp hematomas.       All labs and imaging studies reviewed independently today         Alicia Del Rio, JUAN - CNP  10:01 AM  9/2/2024

## 2024-09-02 NOTE — CONSULTS
Consult Note            Date:9/1/2024        Patient Name:Natasha Joe     YOB: 1975     Age:49 y.o.    Consults    Chief Complaint     Chief Complaint   Patient presents with    Fall     Fall at home in shower. Found at 1800 by boyfriend. Multiple bruises. On legs and arms. Bruise on Lt eye. Pt ANO x 3 at this moment.       The patient was found in the shower with multiple bruising, possible seizure. She was found by her boyfriend.    History Obtained From     Medical chart, very limited information from the patient.    History of Present Illness     The patient is a 49-year-old right-handed white female who was brought to the hospital last night because of multiple trauma bruising involving her body.  The patient's medical information was obtained primarily from the medical chart.  At the time of my evaluation the patient was very sleepy, lethargic, she could not give detailed information.    According to the medical chart and the emergency department report the patient lives alone but she has a boyfriend.  Her boyfriend went to check on her and he found the patient in the shower with multiple bruising  around 6 PM yesterday.      The patient was lying in the shower and was unable to get up.  She had multiple bruises on her body around her eyes her face elbows lower extremities both feet.  It is not clear what was the patient's mental status at that time.  She could not tell what happened with her whether she slipped or lost consciousness or had a seizure.      When I went to examine the patient on the next day on 1 September 2024 she was somewhat sleepy and she was able to give only brief responses, in short sentences, but she could not give her whole history.    She stated that she did not remember how she fell or what happened with her.      When I asked her about seizures she said that she had seizures in the past but she was not on any antiepileptic medications.      Her initial evaluation in  brought to the hospital.    The patient has a history of prior seizures but she was not on any antiepileptic medications.     I recommend to obtain a brain MRI scan with and without contrast and also with attention to the face, including the orbits face and neck to better assess for any soft tissue injury hematomas or swelling.      I tried to examine her eyes but she had difficulty tolerating the exam because of her pain.    When I tried to lift up her upper eyelids she had significant pain and discomfort.    I could not check her fundi and also her conjunctivae showed some ecchymosis possible some hematoma.    I will request an ophthalmology consultation for further evaluation.    The patient has rhabdomyolysis due to her multiple bruising and injuries.  Continue IV fluids, proper hydration.    Nephrology was consulted and made adjustment in the patient treatment because of her acute renal injury due to the rhabdomyolysis.    CPK level, aldolase and myoglobin will be checked and followed.    Patient need an EEG and even 24-hour EEG monitoring to evaluate her for seizures.    She was already started on IV Keppra 500 mg twice a day.      She was also on Lyrica most likely for her spine since she had a cervical spine surgery in the past.    She was also on Topamax 200 mg at nighttime but I am not sure whether this was prescribed for seizures or this was started in the emergency room or she was on it before.    In the hospital she was started on Depakote sprinkles 125 mg 3 times a day for agitation and restlessness.    Keppra and Depakote can be a good combination for seizure treatment, however the Topamax with Depakote can cause significant side effects.  I will discontinue her Topamax and for now continue with the Keppra and the Depakote.   Eventually she should be on only 1 antiepileptic medication depending on her EEG findings and the type of seizures she has.    Need to check the patient for any infections,

## 2024-09-02 NOTE — CONSULTS
General Surgery   Consult Note      Patient's Name/Date of Birth: Natasha Joe / 1975    Date: September 2, 2024     PCP: No primary care provider on file.     Chief Complaint:   Chief Complaint   Patient presents with    Fall     Fall at home in shower. Found at 1800 by boyfriend. Multiple bruises. On legs and arms. Bruise on Lt eye. Pt ANO x 3 at this moment.          HPI:   Natasha Joe is a 49 y.o. female who presents for evaluation of fall. She was found down by her boyfriend in the shower with multiple bruises. Has a history of seizures but is not on any medications. Found to have SEBASTIAN and possible rhabdomyolysis. She was also found to have elevated LFTs, ,  with normal bilirubin. CT scan demonstrated distended gallbladder with evidence of a gallstone in the common bile duct. General surgery was consulted for management of choledocholithiasis.       Patient Active Problem List   Diagnosis    Elevated CK    SEBASTIAN (acute kidney injury) (HCC)    Seizure (HCC)    Elevated LFTs    Hypothyroidism    Rhabdomyolysis    Fall in (into) shower or empty bathtub, initial encounter       No Known Allergies    Past Medical History:   Diagnosis Date    History of heart valve regurgitation     Seizure (HCC)        No past surgical history on file.    Social History     Socioeconomic History    Marital status:      Spouse name: Not on file    Number of children: Not on file    Years of education: Not on file    Highest education level: Not on file   Occupational History    Not on file   Tobacco Use    Smoking status: Unknown    Smokeless tobacco: Not on file   Substance and Sexual Activity    Alcohol use: Not on file    Drug use: Not on file    Sexual activity: Not on file   Other Topics Concern    Not on file   Social History Narrative    Not on file     Social Determinants of Health     Financial Resource Strain: Not on file   Food Insecurity: Patient Unable To Answer (9/1/2024)    Hunger Vital  Health Maintenance Due   Topic Date Due   • COVID-19 Vaccine (1) Never done   • Annual Physical (ages 3-18)  09/10/2021       Patient is due for topics as listed above but is not proceeding with Immunization(s) COVID-19 at this time. Will discuss   Sign     Worried About Running Out of Food in the Last Year: Patient unable to answer     Ran Out of Food in the Last Year: Patient unable to answer   Transportation Needs: Patient Unable To Answer (9/1/2024)    PRAPARE - Transportation     Lack of Transportation (Medical): Patient unable to answer     Lack of Transportation (Non-Medical): Patient unable to answer   Physical Activity: Not on file   Stress: Not on file   Social Connections: Patient Declined (9/2/2024)    Social Connections ([place] HRSN)     If for any reason you need help with day-to-day activities such as bathing, preparing meals, shopping, managing finances, etc., do you get the help you need?: Not on file   Intimate Partner Violence: Not on file   Housing Stability: Patient Unable To Answer (9/1/2024)    Housing Stability Vital Sign     Unable to Pay for Housing in the Last Year: Patient unable to answer     Number of Times Moved in the Last Year: 1     Homeless in the Last Year: Patient unable to answer       The patient has a family history that is negative for severe cardiovascular or respiratory issues, negative for reaction to anesthesia.     Recent Labs     08/31/24 1900 09/01/24 0505 09/02/24  0224   WBC 18.3* 15.2* 10.1   HGB 12.6 11.4* 9.2*   HCT 38.3 34.3 28.6*   MCV 95.0 96.6 95.0    291 276       Recent Labs     09/01/24 0505 09/01/24 2002 09/02/24  0224   * 145 143   K 3.4* 3.5 3.5   CO2 17* 21* 20*   PHOS 3.1  --  2.0*   BUN 21* 22* 20   CREATININE 1.4* 1.1* 1.0       Recent Labs     08/31/24  1900 08/31/24 1933 09/01/24  0505   ALKPHOS 264*  --  212*   *  --  231*   *  --  240*   BILITOT 0.9  --  0.9   INR  --  1.3  --          Intake/Output Summary (Last 24 hours) at 9/2/2024 1335  Last data filed at 9/2/2024 0531  Gross per 24 hour   Intake --   Output 750 ml   Net -750 ml       I have reviewed relevant labs from this admission and my interpretation is included in my assessment and plan      Review of

## 2024-09-02 NOTE — PROGRESS NOTES
Progress Note  Date:2024       Room:8505/8505-B  Patient Name:Natasha Joe     YOB: 1975     Age:49 y.o.    Natasha Joe is a 49 y.o. female who presents to [facility] ER complaining of fall.     Natasha Joe has a past medical history that includes seizure, DDD     Natasha presents to ER with a fall.  She was found by her boyfriend in the shower with multiple bruises.  Unsure if she lost consciousness or had a seizure prior to falling.  She is unsure whether she hit her head.  She was complaining of neck pain.  She has extensive bruising. Denies drug or alcohol use.  She does have a history of previous seizures but is not on any medication for seizures.  Per EMS she was having intermittent confusion.  In the ER, she was found to have SEBASTIAN along with elevated CK at 10,288  Discussed patient's case with ED physician.     ER Course  Upon presentation to the ER, routine labwork was performed which revealed sodium 148, chloride of 113, CO2 18, BUN 21, creatinine 1.9, anion gap 17, CK 10,288, troponin 19, alk phos 264, , .  Imaging results are as outlined below in the Imaging section of this note. Upon arrival to the ER, patient was 134/88, tachycardic 114.  The patient received 1 L normal saline, LR in the emergency room and was admitted to [facility].    Subjective: Patient seen at bedside, no acute complaints today  Review of systems: Negative other than mentioned above    Objective         Vitals Last 24 Hours:  TEMPERATURE:  Temp  Av.1 °F (36.7 °C)  Min: 97.7 °F (36.5 °C)  Max: 98.4 °F (36.9 °C)  RESPIRATIONS RANGE: Resp  Av.5  Min: 16  Max: 18  PULSE OXIMETRY RANGE: SpO2  Av.2 %  Min: 95 %  Max: 99 %  PULSE RANGE: Pulse  Av.4  Min: 98  Max: 102  BLOOD PRESSURE RANGE: Systolic (24hrs), Av , Min:99 , Max:131   ; Diastolic (24hrs), Av, Min:60, Max:85    I/O (24Hr):    Intake/Output Summary (Last 24 hours) at 2024 0957  Last data filed at  9/2/2024 0531  Gross per 24 hour   Intake --   Output 750 ml   Net -750 ml     Objective:  General Appearance:  Comfortable.    Vital signs: (most recent): Blood pressure 99/60, pulse 98, temperature 98.4 °F (36.9 °C), temperature source Temporal, resp. rate 18, height 1.676 m (5' 6\"), weight 72.6 kg (160 lb), SpO2 97%.  Vital signs are normal.    Output: Producing urine and producing stool.    HEENT: Normal HEENT exam.    Lungs:  Normal effort and normal respiratory rate.  Breath sounds clear to auscultation.    Heart: Normal rate.  Regular rhythm.  S1 normal and S2 normal.    Abdomen: Abdomen is soft.  Bowel sounds are normal.   There is no abdominal tenderness.     Extremities: Normal range of motion.    Pulses: Distal pulses are intact.    Neurological: Patient is alert and oriented to person, place and time.  Normal strength.    Pupils:  Pupils are equal, round, and reactive to light.    Skin:  Warm.      Labs/Imaging/Diagnostics    Labs:  CBC:  Recent Labs     08/31/24 1900 09/01/24 0505 09/02/24 0224   WBC 18.3* 15.2* 10.1   RBC 4.03 3.55 3.01*   HGB 12.6 11.4* 9.2*   HCT 38.3 34.3 28.6*   MCV 95.0 96.6 95.0   RDW 14.2 14.2 14.3    291 276     CHEMISTRIES:  Recent Labs     08/31/24 1900 09/01/24  0505 09/01/24 2002 09/02/24  0224   * 150* 145 143   K 3.8 3.4* 3.5 3.5   * 118* 114* 113*   CO2 18* 17* 21* 20*   BUN 21* 21* 22* 20   CREATININE 1.9* 1.4* 1.1* 1.0   GLUCOSE 114* 91 169* 115*   PHOS  --  3.1  --  2.0*   MG 2.9* 2.5  --  1.8     PT/INR:  Recent Labs     08/31/24  1933   PROTIME 13.8*   INR 1.3     APTT:  Recent Labs     08/31/24 1900   APTT 31.0     LIVER PROFILE:  Recent Labs     08/31/24 1900 09/01/24  0505   * 240*   * 231*   BILITOT 0.9 0.9   ALKPHOS 264* 212*       Imaging Last 24 Hours:  US GALLBLADDER RUQ    Result Date: 8/31/2024  EXAMINATION: RIGHT UPPER QUADRANT ULTRASOUND 8/31/2024 10:41 pm COMPARISON: None. HISTORY: ORDERING SYSTEM PROVIDED

## 2024-09-02 NOTE — PROGRESS NOTES
New consult called to Ophthalmology Dr. Jeffery.     Per Dr. Jeffery place cold compresses on until pt is seen.

## 2024-09-03 ENCOUNTER — APPOINTMENT (OUTPATIENT)
Dept: NEUROLOGY | Age: 49
DRG: 263 | End: 2024-09-03
Payer: MEDICAID

## 2024-09-03 ENCOUNTER — APPOINTMENT (OUTPATIENT)
Dept: GENERAL RADIOLOGY | Age: 49
DRG: 263 | End: 2024-09-03
Payer: MEDICAID

## 2024-09-03 ENCOUNTER — ANESTHESIA EVENT (OUTPATIENT)
Dept: ENDOSCOPY | Age: 49
End: 2024-09-03
Payer: MEDICAID

## 2024-09-03 ENCOUNTER — ANESTHESIA (OUTPATIENT)
Dept: ENDOSCOPY | Age: 49
End: 2024-09-03
Payer: MEDICAID

## 2024-09-03 PROBLEM — K80.50 CHOLEDOCHOLITHIASIS: Status: ACTIVE | Noted: 2024-08-31

## 2024-09-03 LAB
ANION GAP SERPL CALCULATED.3IONS-SCNC: 11 MMOL/L (ref 7–16)
BUN SERPL-MCNC: 12 MG/DL (ref 6–20)
CALCIUM SERPL-MCNC: 8.8 MG/DL (ref 8.6–10.2)
CHLORIDE SERPL-SCNC: 115 MMOL/L (ref 98–107)
CK SERPL-CCNC: 4297 U/L (ref 20–180)
CO2 SERPL-SCNC: 19 MMOL/L (ref 22–29)
CREAT SERPL-MCNC: 1 MG/DL (ref 0.5–1)
ERYTHROCYTE [DISTWIDTH] IN BLOOD BY AUTOMATED COUNT: 14.2 % (ref 11.5–15)
GFR, ESTIMATED: 71 ML/MIN/1.73M2
GLUCOSE SERPL-MCNC: 94 MG/DL (ref 74–99)
HAV IGM SERPL QL IA: NONREACTIVE
HBV CORE IGM SERPL QL IA: NONREACTIVE
HBV SURFACE AG SERPL QL IA: NONREACTIVE
HCT VFR BLD AUTO: 27.1 % (ref 34–48)
HCV AB SERPL QL IA: NONREACTIVE
HGB BLD-MCNC: 9 G/DL (ref 11.5–15.5)
HIV 1+2 AB+HIV1 P24 AG SERPL QL IA: NONREACTIVE
MAGNESIUM SERPL-MCNC: 1.6 MG/DL (ref 1.6–2.6)
MCH RBC QN AUTO: 32 PG (ref 26–35)
MCHC RBC AUTO-ENTMCNC: 33.2 G/DL (ref 32–34.5)
MCV RBC AUTO: 96.4 FL (ref 80–99.9)
MICROORGANISM SPEC CULT: NO GROWTH
PHOSPHATE SERPL-MCNC: 2.3 MG/DL (ref 2.5–4.5)
PLATELET # BLD AUTO: 261 K/UL (ref 130–450)
PMV BLD AUTO: 11.5 FL (ref 7–12)
POTASSIUM SERPL-SCNC: 3.6 MMOL/L (ref 3.5–5)
RBC # BLD AUTO: 2.81 M/UL (ref 3.5–5.5)
RPR SER QL: NONREACTIVE
SERVICE CMNT-IMP: NORMAL
SODIUM SERPL-SCNC: 145 MMOL/L (ref 132–146)
SPECIMEN DESCRIPTION: NORMAL
T4 FREE SERPL-MCNC: 0.7 NG/DL (ref 0.9–1.7)
WBC OTHER # BLD: 8.8 K/UL (ref 4.5–11.5)

## 2024-09-03 PROCEDURE — 80048 BASIC METABOLIC PNL TOTAL CA: CPT

## 2024-09-03 PROCEDURE — BF141ZZ FLUOROSCOPY OF GALLBLADDER, BILE DUCTS AND PANCREATIC DUCTS USING LOW OSMOLAR CONTRAST: ICD-10-PCS | Performed by: SURGERY

## 2024-09-03 PROCEDURE — 6370000000 HC RX 637 (ALT 250 FOR IP)

## 2024-09-03 PROCEDURE — 99233 SBSQ HOSP IP/OBS HIGH 50: CPT | Performed by: CLINICAL NURSE SPECIALIST

## 2024-09-03 PROCEDURE — 2580000003 HC RX 258: Performed by: STUDENT IN AN ORGANIZED HEALTH CARE EDUCATION/TRAINING PROGRAM

## 2024-09-03 PROCEDURE — 0FC98ZZ EXTIRPATION OF MATTER FROM COMMON BILE DUCT, VIA NATURAL OR ARTIFICIAL OPENING ENDOSCOPIC: ICD-10-PCS | Performed by: SURGERY

## 2024-09-03 PROCEDURE — 74328 X-RAY BILE DUCT ENDOSCOPY: CPT | Performed by: STUDENT IN AN ORGANIZED HEALTH CARE EDUCATION/TRAINING PROGRAM

## 2024-09-03 PROCEDURE — C1726 CATH, BAL DIL, NON-VASCULAR: HCPCS | Performed by: STUDENT IN AN ORGANIZED HEALTH CARE EDUCATION/TRAINING PROGRAM

## 2024-09-03 PROCEDURE — 3700000000 HC ANESTHESIA ATTENDED CARE: Performed by: STUDENT IN AN ORGANIZED HEALTH CARE EDUCATION/TRAINING PROGRAM

## 2024-09-03 PROCEDURE — 2709999900 HC NON-CHARGEABLE SUPPLY: Performed by: STUDENT IN AN ORGANIZED HEALTH CARE EDUCATION/TRAINING PROGRAM

## 2024-09-03 PROCEDURE — 2060000000 HC ICU INTERMEDIATE R&B

## 2024-09-03 PROCEDURE — 97535 SELF CARE MNGMENT TRAINING: CPT

## 2024-09-03 PROCEDURE — 99222 1ST HOSP IP/OBS MODERATE 55: CPT | Performed by: SURGERY

## 2024-09-03 PROCEDURE — 51702 INSERT TEMP BLADDER CATH: CPT

## 2024-09-03 PROCEDURE — 99232 SBSQ HOSP IP/OBS MODERATE 35: CPT

## 2024-09-03 PROCEDURE — 6360000002 HC RX W HCPCS

## 2024-09-03 PROCEDURE — 3609015200 HC ERCP REMOVE CALCULI/DEBRIS BILIARY/PANCREAS DUCT: Performed by: STUDENT IN AN ORGANIZED HEALTH CARE EDUCATION/TRAINING PROGRAM

## 2024-09-03 PROCEDURE — 2720000010 HC SURG SUPPLY STERILE: Performed by: STUDENT IN AN ORGANIZED HEALTH CARE EDUCATION/TRAINING PROGRAM

## 2024-09-03 PROCEDURE — 97165 OT EVAL LOW COMPLEX 30 MIN: CPT

## 2024-09-03 PROCEDURE — 2580000003 HC RX 258

## 2024-09-03 PROCEDURE — 95816 EEG AWAKE AND DROWSY: CPT

## 2024-09-03 PROCEDURE — 2580000003 HC RX 258: Performed by: INTERNAL MEDICINE

## 2024-09-03 PROCEDURE — 6370000000 HC RX 637 (ALT 250 FOR IP): Performed by: INTERNAL MEDICINE

## 2024-09-03 PROCEDURE — 7100000000 HC PACU RECOVERY - FIRST 15 MIN: Performed by: STUDENT IN AN ORGANIZED HEALTH CARE EDUCATION/TRAINING PROGRAM

## 2024-09-03 PROCEDURE — 97161 PT EVAL LOW COMPLEX 20 MIN: CPT

## 2024-09-03 PROCEDURE — 74330 X-RAY BILE/PANC ENDOSCOPY: CPT

## 2024-09-03 PROCEDURE — 84100 ASSAY OF PHOSPHORUS: CPT

## 2024-09-03 PROCEDURE — 97530 THERAPEUTIC ACTIVITIES: CPT

## 2024-09-03 PROCEDURE — 7100000001 HC PACU RECOVERY - ADDTL 15 MIN: Performed by: STUDENT IN AN ORGANIZED HEALTH CARE EDUCATION/TRAINING PROGRAM

## 2024-09-03 PROCEDURE — C2617 STENT, NON-COR, TEM W/O DEL: HCPCS | Performed by: STUDENT IN AN ORGANIZED HEALTH CARE EDUCATION/TRAINING PROGRAM

## 2024-09-03 PROCEDURE — 99024 POSTOP FOLLOW-UP VISIT: CPT | Performed by: SURGERY

## 2024-09-03 PROCEDURE — 43264 ERCP REMOVE DUCT CALCULI: CPT | Performed by: STUDENT IN AN ORGANIZED HEALTH CARE EDUCATION/TRAINING PROGRAM

## 2024-09-03 PROCEDURE — 2500000003 HC RX 250 WO HCPCS: Performed by: STUDENT IN AN ORGANIZED HEALTH CARE EDUCATION/TRAINING PROGRAM

## 2024-09-03 PROCEDURE — 83735 ASSAY OF MAGNESIUM: CPT

## 2024-09-03 PROCEDURE — 3700000001 HC ADD 15 MINUTES (ANESTHESIA): Performed by: STUDENT IN AN ORGANIZED HEALTH CARE EDUCATION/TRAINING PROGRAM

## 2024-09-03 PROCEDURE — 6360000004 HC RX CONTRAST MEDICATION: Performed by: STUDENT IN AN ORGANIZED HEALTH CARE EDUCATION/TRAINING PROGRAM

## 2024-09-03 PROCEDURE — 82550 ASSAY OF CK (CPK): CPT

## 2024-09-03 PROCEDURE — 85027 COMPLETE CBC AUTOMATED: CPT

## 2024-09-03 PROCEDURE — 43274 ERCP DUCT STENT PLACEMENT: CPT | Performed by: STUDENT IN AN ORGANIZED HEALTH CARE EDUCATION/TRAINING PROGRAM

## 2024-09-03 PROCEDURE — C2625 STENT, NON-COR, TEM W/DEL SY: HCPCS | Performed by: STUDENT IN AN ORGANIZED HEALTH CARE EDUCATION/TRAINING PROGRAM

## 2024-09-03 PROCEDURE — 3609015100 HC ERCP STENT PLACEMENT BILIARY/PANCREATIC DUCT: Performed by: STUDENT IN AN ORGANIZED HEALTH CARE EDUCATION/TRAINING PROGRAM

## 2024-09-03 PROCEDURE — 95816 EEG AWAKE AND DROWSY: CPT | Performed by: PSYCHIATRY & NEUROLOGY

## 2024-09-03 PROCEDURE — 36415 COLL VENOUS BLD VENIPUNCTURE: CPT

## 2024-09-03 PROCEDURE — 99232 SBSQ HOSP IP/OBS MODERATE 35: CPT | Performed by: SURGERY

## 2024-09-03 PROCEDURE — 3609014900 HC ERCP W/SPHINCTEROTOMY &/OR PAPILLOTOMY: Performed by: STUDENT IN AN ORGANIZED HEALTH CARE EDUCATION/TRAINING PROGRAM

## 2024-09-03 PROCEDURE — 6360000002 HC RX W HCPCS: Performed by: INTERNAL MEDICINE

## 2024-09-03 PROCEDURE — 0F798DZ DILATION OF COMMON BILE DUCT WITH INTRALUMINAL DEVICE, VIA NATURAL OR ARTIFICIAL OPENING ENDOSCOPIC: ICD-10-PCS | Performed by: SURGERY

## 2024-09-03 DEVICE — BILIARY STENT
Type: IMPLANTABLE DEVICE | Status: FUNCTIONAL
Brand: ADVANIX™ BILIARY

## 2024-09-03 DEVICE — PANCREATIC STENT DELIVERY SYSTEM
Type: IMPLANTABLE DEVICE | Status: FUNCTIONAL
Brand: NAVIFLEX™ RX DELIVERY SYSTEM

## 2024-09-03 RX ORDER — SODIUM CHLORIDE 0.9 % (FLUSH) 0.9 %
5-40 SYRINGE (ML) INJECTION PRN
Status: DISCONTINUED | OUTPATIENT
Start: 2024-09-03 | End: 2024-09-03 | Stop reason: HOSPADM

## 2024-09-03 RX ORDER — BACLOFEN 20 MG/1
20 TABLET ORAL 3 TIMES DAILY
COMMUNITY

## 2024-09-03 RX ORDER — PROPOFOL 10 MG/ML
INJECTION, EMULSION INTRAVENOUS CONTINUOUS PRN
Status: DISCONTINUED | OUTPATIENT
Start: 2024-09-03 | End: 2024-09-03 | Stop reason: SDUPTHER

## 2024-09-03 RX ORDER — PROCHLORPERAZINE EDISYLATE 5 MG/ML
10 INJECTION INTRAMUSCULAR; INTRAVENOUS ONCE
Status: COMPLETED | OUTPATIENT
Start: 2024-09-03 | End: 2024-09-03

## 2024-09-03 RX ORDER — NALOXONE HYDROCHLORIDE 0.4 MG/ML
INJECTION, SOLUTION INTRAMUSCULAR; INTRAVENOUS; SUBCUTANEOUS PRN
Status: DISCONTINUED | OUTPATIENT
Start: 2024-09-03 | End: 2024-09-03 | Stop reason: HOSPADM

## 2024-09-03 RX ORDER — FENTANYL CITRATE 50 UG/ML
INJECTION, SOLUTION INTRAMUSCULAR; INTRAVENOUS PRN
Status: DISCONTINUED | OUTPATIENT
Start: 2024-09-03 | End: 2024-09-03 | Stop reason: SDUPTHER

## 2024-09-03 RX ORDER — SODIUM CHLORIDE 0.9 % (FLUSH) 0.9 %
5-40 SYRINGE (ML) INJECTION EVERY 12 HOURS SCHEDULED
Status: DISCONTINUED | OUTPATIENT
Start: 2024-09-03 | End: 2024-09-03 | Stop reason: HOSPADM

## 2024-09-03 RX ORDER — PREGABALIN 100 MG/1
100 CAPSULE ORAL 2 TIMES DAILY
COMMUNITY

## 2024-09-03 RX ORDER — DULOXETIN HYDROCHLORIDE 60 MG/1
120 CAPSULE, DELAYED RELEASE ORAL DAILY
COMMUNITY

## 2024-09-03 RX ORDER — SODIUM CHLORIDE 9 MG/ML
INJECTION, SOLUTION INTRAVENOUS CONTINUOUS PRN
Status: DISCONTINUED | OUTPATIENT
Start: 2024-09-03 | End: 2024-09-03 | Stop reason: SDUPTHER

## 2024-09-03 RX ORDER — IOPAMIDOL 612 MG/ML
INJECTION, SOLUTION INTRAVASCULAR PRN
Status: DISCONTINUED | OUTPATIENT
Start: 2024-09-03 | End: 2024-09-03 | Stop reason: ALTCHOICE

## 2024-09-03 RX ORDER — TOPIRAMATE 200 MG/1
200 TABLET, FILM COATED ORAL DAILY
COMMUNITY

## 2024-09-03 RX ORDER — BUPROPION HYDROCHLORIDE 100 MG/1
100 TABLET, EXTENDED RELEASE ORAL 2 TIMES DAILY
Status: ON HOLD | COMMUNITY
End: 2024-09-07 | Stop reason: HOSPADM

## 2024-09-03 RX ORDER — LEVOTHYROXINE SODIUM 50 UG/1
50 TABLET ORAL DAILY
COMMUNITY

## 2024-09-03 RX ORDER — SODIUM CHLORIDE 9 MG/ML
INJECTION, SOLUTION INTRAVENOUS PRN
Status: DISCONTINUED | OUTPATIENT
Start: 2024-09-03 | End: 2024-09-03 | Stop reason: HOSPADM

## 2024-09-03 RX ADMIN — BUPROPION HYDROCHLORIDE 100 MG: 100 TABLET, EXTENDED RELEASE ORAL at 20:20

## 2024-09-03 RX ADMIN — PREGABALIN 100 MG: 50 CAPSULE ORAL at 20:19

## 2024-09-03 RX ADMIN — ACETAMINOPHEN 650 MG: 325 TABLET ORAL at 08:38

## 2024-09-03 RX ADMIN — FENTANYL CITRATE 25 MCG: 50 INJECTION, SOLUTION INTRAMUSCULAR; INTRAVENOUS at 18:17

## 2024-09-03 RX ADMIN — CLONIDINE HYDROCHLORIDE 0.2 MG: 0.1 TABLET ORAL at 20:19

## 2024-09-03 RX ADMIN — LEVETIRACETAM 500 MG: 100 INJECTION INTRAVENOUS at 20:18

## 2024-09-03 RX ADMIN — LEVETIRACETAM 500 MG: 100 INJECTION INTRAVENOUS at 08:39

## 2024-09-03 RX ADMIN — SODIUM CHLORIDE, POTASSIUM CHLORIDE, SODIUM LACTATE AND CALCIUM CHLORIDE: 600; 310; 30; 20 INJECTION, SOLUTION INTRAVENOUS at 19:38

## 2024-09-03 RX ADMIN — POLYETHYLENE GLYCOL 3350 17 G: 17 POWDER, FOR SOLUTION ORAL at 08:38

## 2024-09-03 RX ADMIN — PROCHLORPERAZINE EDISYLATE 10 MG: 5 INJECTION INTRAMUSCULAR; INTRAVENOUS at 20:18

## 2024-09-03 RX ADMIN — PROPOFOL 200 MCG/KG/MIN: 10 INJECTION, EMULSION INTRAVENOUS at 17:38

## 2024-09-03 RX ADMIN — PROPOFOL 20 MG: 10 INJECTION, EMULSION INTRAVENOUS at 17:39

## 2024-09-03 RX ADMIN — FENTANYL CITRATE 50 MCG: 50 INJECTION, SOLUTION INTRAMUSCULAR; INTRAVENOUS at 18:08

## 2024-09-03 RX ADMIN — SODIUM CHLORIDE, POTASSIUM CHLORIDE, SODIUM LACTATE AND CALCIUM CHLORIDE: 600; 310; 30; 20 INJECTION, SOLUTION INTRAVENOUS at 05:19

## 2024-09-03 RX ADMIN — SODIUM CHLORIDE: 0.9 INJECTION, SOLUTION INTRAVENOUS at 17:32

## 2024-09-03 RX ADMIN — PIPERACILLIN AND TAZOBACTAM 3375 MG: 3; .375 INJECTION, POWDER, LYOPHILIZED, FOR SOLUTION INTRAVENOUS at 13:27

## 2024-09-03 RX ADMIN — TOPIRAMATE 200 MG: 100 TABLET, FILM COATED ORAL at 20:19

## 2024-09-03 RX ADMIN — TRAMADOL HYDROCHLORIDE 50 MG: 50 TABLET ORAL at 13:22

## 2024-09-03 RX ADMIN — LEVOTHYROXINE SODIUM 50 MCG: 0.03 TABLET ORAL at 05:21

## 2024-09-03 RX ADMIN — DULOXETINE HYDROCHLORIDE 60 MG: 30 CAPSULE, DELAYED RELEASE ORAL at 08:40

## 2024-09-03 RX ADMIN — BUPROPION HYDROCHLORIDE 100 MG: 100 TABLET, EXTENDED RELEASE ORAL at 08:40

## 2024-09-03 RX ADMIN — DOCUSATE SODIUM 100 MG: 100 CAPSULE, LIQUID FILLED ORAL at 08:38

## 2024-09-03 RX ADMIN — POTASSIUM PHOSPHATE, MONOBASIC AND POTASSIUM PHOSPHATE, DIBASIC 30 MMOL: 224; 236 INJECTION, SOLUTION, CONCENTRATE INTRAVENOUS at 10:02

## 2024-09-03 RX ADMIN — PIPERACILLIN AND TAZOBACTAM 3375 MG: 3; .375 INJECTION, POWDER, LYOPHILIZED, FOR SOLUTION INTRAVENOUS at 20:36

## 2024-09-03 RX ADMIN — SODIUM CHLORIDE, POTASSIUM CHLORIDE, SODIUM LACTATE AND CALCIUM CHLORIDE: 600; 310; 30; 20 INJECTION, SOLUTION INTRAVENOUS at 13:11

## 2024-09-03 RX ADMIN — PIPERACILLIN AND TAZOBACTAM 3375 MG: 3; .375 INJECTION, POWDER, LYOPHILIZED, FOR SOLUTION INTRAVENOUS at 05:19

## 2024-09-03 RX ADMIN — PREGABALIN 100 MG: 50 CAPSULE ORAL at 08:38

## 2024-09-03 RX ADMIN — SODIUM CHLORIDE, PRESERVATIVE FREE 10 ML: 5 INJECTION INTRAVENOUS at 08:38

## 2024-09-03 RX ADMIN — TRAMADOL HYDROCHLORIDE 50 MG: 50 TABLET ORAL at 02:56

## 2024-09-03 ASSESSMENT — PAIN DESCRIPTION - ORIENTATION
ORIENTATION: LEFT;ANTERIOR
ORIENTATION: RIGHT;LEFT

## 2024-09-03 ASSESSMENT — PAIN SCALES - GENERAL
PAINLEVEL_OUTOF10: 0
PAINLEVEL_OUTOF10: 10
PAINLEVEL_OUTOF10: 0
PAINLEVEL_OUTOF10: 5
PAINLEVEL_OUTOF10: 10
PAINLEVEL_OUTOF10: 8
PAINLEVEL_OUTOF10: 6
PAINLEVEL_OUTOF10: 0
PAINLEVEL_OUTOF10: 7
PAINLEVEL_OUTOF10: 4
PAINLEVEL_OUTOF10: 7

## 2024-09-03 ASSESSMENT — PAIN SCALES - WONG BAKER: WONGBAKER_NUMERICALRESPONSE: NO HURT

## 2024-09-03 ASSESSMENT — PAIN DESCRIPTION - LOCATION
LOCATION: HEAD;NECK
LOCATION: HEAD;EAR
LOCATION: HEAD

## 2024-09-03 ASSESSMENT — PAIN DESCRIPTION - DESCRIPTORS
DESCRIPTORS: SHARP;SHOOTING;STABBING
DESCRIPTORS: STABBING;ACHING;DISCOMFORT
DESCRIPTORS: STABBING;ACHING;DISCOMFORT
DESCRIPTORS: SHARP;SHOOTING;STABBING
DESCRIPTORS: ACHING;CRAMPING;DULL;SORE

## 2024-09-03 ASSESSMENT — PAIN - FUNCTIONAL ASSESSMENT: PAIN_FUNCTIONAL_ASSESSMENT: ACTIVITIES ARE NOT PREVENTED

## 2024-09-03 NOTE — PROGRESS NOTES
Natasha Joe is a 49 y.o. right handed female     Patient was admitted on 8/31/2024 after falling in the shower.  She was found by her boyfriend but was unclear of the precipitating events.    She had bruises all over her body and including both eyes.    She does have a history of EMU admission where 4 events were captured at [facility] none of which were epileptic.    She was taking Lamictal 200 mg daily and Topamax 200 mg for both headaches and mood stabilization    There was a question of seizures in the past however again EMU demonstrated PNES    She also has a history of migraines and is maintained on Topamax      Allergies as of 08/31/2024    (No Known Allergies)       Objective:     /84   Pulse 85   Temp 97.9 °F (36.6 °C) (Oral)   Resp 16   Ht 1.676 m (5' 6\")   Wt 79.4 kg (175 lb)   SpO2 98%   BMI 28.25 kg/m²      General appearance: alert, appears stated age and cooperative  Head: Normocephalic, without obvious abnormality, atraumatic  Extremities: no cyanosis or edema  Pulses: 2+ and symmetric  Skin: no rashes or lesions    Mental Status: Alert, oriented, thought content appropriate    Speech: clear  Language: appropriate    Cranial Nerves:  I: smell    II: visual acuity     II: visual fields Full   II: pupils EMILY   III,VII: ptosis None   III,IV,VI: extraocular muscles  EOMI without nystagmus    V: mastication Normal   V: facial light touch sensation  Normal   V,VII: corneal reflex  Present   VII: facial muscle function - upper     VII: facial muscle function - lower Normal   VIII: hearing Normal   IX: soft palate elevation  Normal   IX,X: gag reflex    XI: trapezius strength  5/5   XI: sternocleidomastoid strength 5/5   XI: neck extension strength  5/5   XII: tongue strength  Normal     Motor:  5/5 throughout  Normal bulk and tone    Sensory:  Normal to LT    Coordination:   FN symmetrical without ataxia    DTR:   Barely elicited    No Alfaro's     Laboratory/Radiology:     CBC with  Differential:    Lab Results   Component Value Date/Time    WBC 8.8 09/03/2024 05:41 AM    RBC 2.81 09/03/2024 05:41 AM    HGB 9.0 09/03/2024 05:41 AM    HCT 27.1 09/03/2024 05:41 AM     09/03/2024 05:41 AM    MCV 96.4 09/03/2024 05:41 AM    MCH 32.0 09/03/2024 05:41 AM    MCHC 33.2 09/03/2024 05:41 AM    RDW 14.2 09/03/2024 05:41 AM    LYMPHOPCT 8 08/31/2024 07:00 PM    MONOPCT 7 08/31/2024 07:00 PM    EOSPCT 0 08/31/2024 07:00 PM    BASOPCT 1 08/31/2024 07:00 PM    MONOSABS 1.22 08/31/2024 07:00 PM    LYMPHSABS 1.46 08/31/2024 07:00 PM    EOSABS 0.00 08/31/2024 07:00 PM    BASOSABS 0.13 08/31/2024 07:00 PM     CMP:    Lab Results   Component Value Date/Time     09/03/2024 05:41 AM    K 3.6 09/03/2024 05:41 AM     09/03/2024 05:41 AM    CO2 19 09/03/2024 05:41 AM    BUN 12 09/03/2024 05:41 AM    CREATININE 1.0 09/03/2024 05:41 AM    LABGLOM 71 09/03/2024 05:41 AM    GLUCOSE 94 09/03/2024 05:41 AM    CALCIUM 8.8 09/03/2024 05:41 AM    BILITOT 0.5 09/02/2024 10:49 PM    ALKPHOS 134 09/02/2024 10:49 PM     09/02/2024 10:49 PM     09/02/2024 10:49 PM       MRI Brain:  1. Limited study due to prominent patient motion artifact.  Within the  significant limits of this study, the MRI of the brain and orbits is grossly  unremarkable.  2. No evidence of mesial temporal sclerosis.  No other potentially  epileptogenic lesion is identified.  3. Small superficial hematoma in the left forehead.    EEG pending    I personally reviewed the patient's lab and imaging studies at this time.    Assessment:     Patient with a history of psychogenic nonepileptic seizures diagnosed by [facility] with EMU admission and 4 spells being captured    She also has a history of migraine    And was maintained on Topamax as well as Lamictal prior to admission    Patient with a reported syncopal event cannot completely exclude epileptic event though unlikely given her previous history      Plan:     EEG is

## 2024-09-03 NOTE — CONSULTS
Surgical Endoscopy Consult  [facility]  Joseluis Iraheta MD    Patient's Name/Date of Birth: Natasha Joe / 1975    Date: September 3, 2024     Consulting Surgeon: Joseluis Iraheta MD    PCP: No primary care provider on file.     Chief Complaint: Choledocholithiasis    HPI:   Natasha Joe is a 49 y.o. female who presents for evaluation of a fall.  She was found down by her boyfriend in the shower with multiple bruises.  She has a history of seizures.  She had extensive imaging workup on admission including CT of the abdomen and pelvis that showed a thickened distended gallbladder along with a 7 mm stone in the distal common bile duct.  I was consulted for further evaluation and management.  She denies any abdominal pain at this time.  No signs of cholangitis.      Patient Active Problem List   Diagnosis    Elevated CK    SEBASTIAN (acute kidney injury) (HCC)    Seizure (HCC)    Elevated LFTs    Hypothyroidism    Rhabdomyolysis    Fall in (into) shower or empty bathtub, initial encounter    Choledocholithiasis       No Known Allergies    Past Medical History:   Diagnosis Date    History of heart valve regurgitation     Seizure (HCC)        No past surgical history on file.    Social History     Socioeconomic History    Marital status:      Spouse name: Not on file    Number of children: Not on file    Years of education: Not on file    Highest education level: Not on file   Occupational History    Not on file   Tobacco Use    Smoking status: Unknown    Smokeless tobacco: Not on file   Substance and Sexual Activity    Alcohol use: Not on file    Drug use: Not on file    Sexual activity: Not on file   Other Topics Concern    Not on file   Social History Narrative    Not on file     Social Determinants of Health     Financial Resource Strain: Not on file   Food Insecurity: Patient Unable To Answer (9/1/2024)    Hunger Vital Sign     Worried About Running Out of Food in the Last Year: Patient  with stone extraction and possible stenting of common bile duct  Please keep n.p.o. today  Procedure to be performed by myself or Dr. Umana pending availability  The procedure, risks, benefits and alternatives were discussed with the patient.  She agrees to proceed.  Time spent reviewing past medical, surgical, social and family history, vitals, nursing assessment and images.  Time spent face to face with patient and family counciling and discussing care exceeded 50% of the time of the consult. Additional time spent reviewing images and labs, discussing case with nursing, support staff and other physicians; as well as coordinating care.      Joseluis Iraheta MD

## 2024-09-03 NOTE — PROGRESS NOTES
Home medications reviewed and updated with patient at bedside.    full range of motion in all extremities

## 2024-09-03 NOTE — PROGRESS NOTES
Physical Therapy  Physical Therapy Initial Assessment     Name: Natasha Joe  : 1975  MRN: [id number]      Date of Service: 9/3/2024    Evaluating PT:  Hailey Polanco PT, DPT [id number]    Room #:  8505/8505-B  Diagnosis:  Elevated CK [R74.8]  PMHx/PSHx:   has a past medical history of History of heart valve regurgitation and Seizure (HCC).  Precautions:  Fall risk, alarms  Equipment Needs:  TBD    SUBJECTIVE:    Pt reports her plan is to DC to her boyfriend's home. He lives in a 1 story home with 2 steps to enter and 1 rail(s). Pt ambulated with no devices PTA.    OBJECTIVE:   Initial Evaluation  Date: 9/3/24 Treatment Date:  Short Term/ Long Term   Goals   AM-PAC 6 Clicks      Was pt agreeable to Eval/treatment? Yes     Does pt have pain? 9/10 neck and head pain     Bed Mobility  Rolling: NT  Supine to sit: SBA  Sit to supine: SBA  Scooting: SBA  Rolling: Independent  Supine to sit: Independent  Sit to supine: Independent  Scooting: Independent   Transfers Sit to stand: Selin  Stand to sit: Selin  Stand pivot: ModA with no device, Selin with FWW  Sit to stand: Independent  Stand to sit: Independent  Stand pivot: Mod I with AAD   Ambulation    10 feet with no device and Mod A    65 feet x2 with FWW and Selin  >200 feet with AAD and Mod I   Stair negotiation: ascended and descended  NT  >3 steps with unilateral rail and Mod I   ROM BUE:  Refer to OT  BLE:  WFL     Strength BUE:  Refer to OT  BLE:  Not formally assessed     Balance Sitting EOB:  SBA  Dynamic Standing:  Selin with FWW; Mod A with no device  Sitting EOB:  Independent  Dynamic Standing:  Mod I with AAD     Pt is A & O x 4  Sensation:  Pt denies numbness and tingling to extremities  Edema:  Unremarkable     Therapeutic Exercises:  Dynamic balance activities while sitting EOB for ~10 minutes, dynamic balance activities upright in room for ~5 minutes, additional STS from chair, training with assistive device    Patient education  Pt educated on role  positioning - To prevent skin breakdown and contractures.  Skilled assessment of vitals.  Education - Provided for safety, role of PT in acute setting, benefits of upright mobility.    Pt's/ family goals   1. Return to PLOF    Prognosis is Good for reaching above PT goals.    Patient and or family understand(s) diagnosis, prognosis, and plan of care.  Yes     PHYSICAL THERAPY PLAN OF CARE:    PT POC is established based on physician order and patient diagnosis     Referring provider/PT Order:    Start   Ordering Provider    08/31/24 2200  PT eval and treat  Start:  08/31/24 2200,   End:  08/31/24 2200,   ONE TIME,   Standing Count:  1 Occurrences,   R         Neyda Vela M, DO      Diagnosis:  Elevated CK [R74.8]  Specific instructions for next treatment:  Increase mobility    Current Treatment Recommendations:     [x] Strengthening to improve independence with functional mobility   [x] ROM to improve independence with functional mobility   [x] Balance Training to improve static/dynamic balance and to reduce fall risk  [x] Endurance Training to improve activity tolerance during functional mobility   [x] Transfer Training to improve safety and independence with all functional transfers   [x] Gait Training to improve gait mechanics, endurance and assess need for appropriate assistive device  [x] Stair Training in preparation for safe discharge home and/or into the community   [x] Positioning to prevent skin breakdown and contractures  [x] Safety and Education Training   [x] Patient/Caregiver Education   [x] HEP  [] Other     PT long term treatment goals are located in above grid    Frequency of treatments: 2-5x/week x 1-2 weeks.    Time in  0940  Time out  1003    Total Treatment Time  8 minutes     Evaluation Time includes thorough review of current medical information, gathering information on past medical history/social history and prior level of function, completion of standardized testing/informal

## 2024-09-03 NOTE — ANESTHESIA PRE PROCEDURE
Counseling given: Not Answered      Vital Signs (Current):   Vitals:    09/03/24 0326 09/03/24 0520 09/03/24 0715 09/03/24 1115   BP:   111/74 130/84   Pulse:   88 85   Resp: 18  16 16   Temp:   36.8 °C (98.2 °F) 36.6 °C (97.9 °F)   TempSrc:   Oral Oral   SpO2:   98%    Weight:  79.4 kg (175 lb)     Height:                                                  BP Readings from Last 3 Encounters:   09/03/24 130/84       NPO Status:                                                                                 BMI:   Wt Readings from Last 3 Encounters:   09/03/24 79.4 kg (175 lb)     Body mass index is 28.25 kg/m².    CBC:   Lab Results   Component Value Date/Time    WBC 8.8 09/03/2024 05:41 AM    RBC 2.81 09/03/2024 05:41 AM    HGB 9.0 09/03/2024 05:41 AM    HCT 27.1 09/03/2024 05:41 AM    MCV 96.4 09/03/2024 05:41 AM    RDW 14.2 09/03/2024 05:41 AM     09/03/2024 05:41 AM       CMP:   Lab Results   Component Value Date/Time     09/03/2024 05:41 AM    K 3.6 09/03/2024 05:41 AM     09/03/2024 05:41 AM    CO2 19 09/03/2024 05:41 AM    BUN 12 09/03/2024 05:41 AM    CREATININE 1.0 09/03/2024 05:41 AM    LABGLOM 71 09/03/2024 05:41 AM    GLUCOSE 94 09/03/2024 05:41 AM    CALCIUM 8.8 09/03/2024 05:41 AM    BILITOT 0.5 09/02/2024 10:49 PM    ALKPHOS 134 09/02/2024 10:49 PM     09/02/2024 10:49 PM     09/02/2024 10:49 PM       POC Tests: No results for input(s): \"POCGLU\", \"POCNA\", \"POCK\", \"POCCL\", \"POCBUN\", \"POCHEMO\", \"POCHCT\" in the last 72 hours.    Coags:   Lab Results   Component Value Date/Time    PROTIME 13.8 08/31/2024 07:33 PM    INR 1.3 08/31/2024 07:33 PM    APTT 31.0 08/31/2024 07:00 PM       HCG (If Applicable): No results found for: \"PREGTESTUR\", \"PREGSERUM\", \"HCG\", \"HCGQUANT\"     ABGs: No results found for: \"PHART\", \"PO2ART\", \"GRX1NWI\", \"DJW4RBN\", \"BEART\", \"Y5TQZZJB\"     Type & Screen (If Applicable):  No results found for: \"LABABO\"    Drug/Infectious Status (If Applicable):  Lab

## 2024-09-03 NOTE — PROGRESS NOTES
heart valve regurgitation     Seizure (HCC)        Patient Active Problem List   Diagnosis    Elevated CK    SEBASTIAN (acute kidney injury) (HCC)    Seizure (HCC)    Elevated LFTs    Hypothyroidism    Rhabdomyolysis    Fall in (into) shower or empty bathtub, initial encounter    Choledocholithiasis       Diet:    Diet NPO Exceptions are: Sips of Water with Meds    Meds:     potassium phosphate IVPB (PERIPHERAL LINE)  30 mmol IntraVENous Once    topiramate  200 mg Oral Nightly    nicotine  1 patch TransDERmal Daily    piperacillin-tazobactam  3,375 mg IntraVENous Q8H    buPROPion  100 mg Oral BID    DULoxetine  60 mg Oral Daily    levothyroxine  50 mcg Oral Daily    pregabalin  100 mg Oral BID    levETIRAcetam  500 mg IntraVENous Q12H    sodium chloride flush  5-40 mL IntraVENous 2 times per day    [Held by provider] enoxaparin  40 mg SubCUTAneous Daily    cloNIDine  0.2 mg Oral Nightly    polyethylene glycol  17 g Oral Daily    docusate sodium  100 mg Oral BID        lactated ringers IV soln 175 mL/hr at 09/03/24 0519    sodium chloride         Meds prn:     traMADol, LORazepam, benzocaine-menthol, benzonatate, calcium carbonate, hydrALAZINE, Polyvinyl Alcohol-Povidone PF, sodium chloride, sodium chloride flush, sodium chloride, potassium chloride **OR** potassium alternative oral replacement **OR** potassium chloride, magnesium sulfate, ondansetron **OR** ondansetron, polyethylene glycol, acetaminophen **OR** acetaminophen    Meds prior to admission:     No current facility-administered medications on file prior to encounter.     Current Outpatient Medications on File Prior to Encounter   Medication Sig Dispense Refill    baclofen (LIORESAL) 20 MG tablet 1 tablet 3 times daily      pregabalin (LYRICA) 100 MG capsule Take 1 capsule by mouth 2 times daily.      levothyroxine (SYNTHROID) 50 MCG tablet Take 1 tablet by mouth Daily      topiramate (TOPAMAX) 200 MG tablet Take 1 tablet by mouth daily      buPROPion  (WELLBUTRIN SR) 100 MG extended release tablet Take 1 tablet by mouth 2 times daily      DULoxetine (CYMBALTA) 60 MG extended release capsule Take 2 capsules by mouth daily         Allergies:    Patient has no known allergies.    Social History:         Family History:     No family history on file.    Physical Exam:      Patient Vitals for the past 24 hrs:   BP Temp Temp src Pulse Resp SpO2 Weight   09/03/24 0715 111/74 98.2 °F (36.8 °C) Oral 88 16 98 % --   09/03/24 0520 -- -- -- -- -- -- 79.4 kg (175 lb)   09/03/24 0326 -- -- -- -- 18 -- --   09/03/24 0256 -- -- -- -- 18 -- --   09/03/24 0015 100/61 98.5 °F (36.9 °C) Temporal 89 18 97 % --   09/02/24 1930 (!) 125/90 98.1 °F (36.7 °C) Oral 95 18 100 % --   09/02/24 1510 115/76 98.4 °F (36.9 °C) Oral (!) 103 18 93 % --   09/02/24 1241 -- -- -- -- 16 -- --   09/02/24 1107 116/73 98.2 °F (36.8 °C) Temporal 93 18 95 % --         Intake/Output Summary (Last 24 hours) at 9/3/2024 1044  Last data filed at 9/3/2024 0523  Gross per 24 hour   Intake --   Output 1600 ml   Net -1600 ml       General: Awake, alert, no acute distress; facial bruising  Neck: No JVD noted  Lungs: Clear bilaterally upper, diminished to the bases bilaterally.  Unlabored  CV: Regular rate and rhythm.  No rub  Abd: Soft, nontender, nondistended.  Active bowel sounds  Skin: Warm and dry.  No rash on exposed extremities  Ext: Trace BLE edema   Neuro: Awake, answers questions appropriately    Data:    Recent Labs     09/01/24  0505 09/02/24  0224 09/03/24  0541   WBC 15.2* 10.1 8.8   HGB 11.4* 9.2* 9.0*   HCT 34.3 28.6* 27.1*   MCV 96.6 95.0 96.4    276 261       Recent Labs     09/01/24  0505 09/01/24 2002 09/02/24 0224 09/03/24  0541   * 145 143 145   K 3.4* 3.5 3.5 3.6   * 114* 113* 115*   CO2 17* 21* 20* 19*   CREATININE 1.4* 1.1* 1.0 1.0   BUN 21* 22* 20 12   LABGLOM 46* 64 73 71   GLUCOSE 91 169* 115* 94   CALCIUM 9.6 9.3 8.6 8.8   PHOS 3.1  --  2.0* 2.3*   MG 2.5  --  1.8 1.6

## 2024-09-03 NOTE — ANESTHESIA POSTPROCEDURE EVALUATION
Department of Anesthesiology  Postprocedure Note    Patient: Natasha Joe  MRN: [id number]  YOB: 1975  Date of evaluation: 9/3/2024    Procedure Summary       Date: 09/03/24 Room / Location: [place] [address] / [facility]    Anesthesia Start: 1732 Anesthesia Stop: 1834    Procedures:       ENDOSCOPIC RETROGRADE CHOLANGIOPANCREATOGRAPHY STONE REMOVAL      ENDOSCOPIC RETROGRADE CHOLANGIOPANCREATOGRAPHY STENT INSERTION      ENDOSCOPIC RETROGRADE CHOLANGIOPANCREATOGRAPHY SPHINCTER/PAPILLOTOMY Diagnosis:       Choledocholithiasis      (Choledocholithiasis [K80.50])    Surgeons: Matheus Umana MD Responsible Provider: Larry Ovalle MD    Anesthesia Type: MAC ASA Status: 3            Anesthesia Type: MAC    Erum Phase I: Erum Score: 10    Erum Phase II:      Anesthesia Post Evaluation    Patient location during evaluation: PACU  Patient participation: complete - patient participated  Level of consciousness: awake and alert  Pain score: 0  Airway patency: patent  Nausea & Vomiting: no nausea and no vomiting  Cardiovascular status: blood pressure returned to baseline and hemodynamically stable  Respiratory status: acceptable and room air  Hydration status: euvolemic  Pain management: adequate        No notable events documented.

## 2024-09-03 NOTE — PROGRESS NOTES
Transferred to PACU  post ERCP.  Patient identification completed with endoscopy RN.  Positioned on right side.  Complaint of mild neck discomfort that she \"usually has.\"  Warm blankets and repositioning provided for comfort
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
## 2024-09-03 NOTE — PROGRESS NOTES
Pt verified using 2 Identifiers and ID band with OR staff prior to acceptance to PACU care.   Patient to  PACU & placed on appropriate monitors.   Cart low, locked with siderails up.

## 2024-09-03 NOTE — PLAN OF CARE
Problem: Safety - Adult  Goal: Free from fall injury  Outcome: Progressing     Problem: Discharge Planning  Goal: Discharge to home or other facility with appropriate resources  Outcome: Progressing     Problem: Pain  Goal: Verbalizes/displays adequate comfort level or baseline comfort level  Outcome: Progressing     Problem: Confusion  Goal: Confusion, delirium, dementia, or psychosis is improved or at baseline  Description: INTERVENTIONS:  1. Assess for possible contributors to thought disturbance, including medications, impaired vision or hearing, underlying metabolic abnormalities, dehydration, psychiatric diagnoses, and notify attending LIP  2. [place] high risk fall precautions, as indicated  3. Provide frequent short contacts to provide reality reorientation, refocusing and direction  4. Decrease environmental stimuli, including noise as appropriate  5. Monitor and intervene to maintain adequate nutrition, hydration, elimination, sleep and activity  6. If unable to ensure safety without constant attention obtain sitter and review sitter guidelines with assigned personnel  7. Initiate Psychosocial CNS and Spiritual Care consult, as indicated  Outcome: Progressing
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
## 2024-09-03 NOTE — PROGRESS NOTES
Occupational Therapy  Facility/Department: [address] IMCU/NEURO  Occupational Therapy Initial Assessment    Name: Natasha Joe  : 1975  MRN: [id number]  Date of Service: 9/3/2024  Room: 8505B       Evaluating OT: Idalmis Asif OTR/L 70411    Referring Provider: DO Mirian    Specific Provider Orders/Date: OT eval and treat (24)    Diagnosis:  Fall @ home 24- SEBASTIAN & rhabdomyolysis  Pertinent Medical History:  Seizures, DDD & history heart valve regurgitation  Precautions:  Fall Risk, Seizure, NPO (sips with meds)     Assessment of current deficits    [x] Functional mobility  [x]ADLs  [x] Strength               []Cognition    [x] Functional transfers   [x] IADLs         [x] Safety Awareness   [x]Endurance    [] Fine Coordination              [x] Balance      [] Vision/perception   []Sensation     []Gross Motor Coordination  [] ROM  [] Delirium                   [] Motor Control     OT PLAN OF CARE   OT POC based on physician orders, patient diagnosis and results of clinical assessment    Frequency/Duration 1-3 days/wk for 2 weeks PRN   Specific OT Treatment Interventions to include:   * Instruction/training on adapted ADL techniques and AE recommendations to increase functional independence within precautions       * Training on energy conservation strategies, correct breathing pattern and techniques to improve independence/tolerance for self-care routine  * Functional transfer/mobility training/DME recommendations for increased independence, safety, and fall prevention  * Patient/Family education to increase follow through with safety techniques and functional independence  * Recommendation of environmental modifications for increased safety with functional transfers/mobility and ADLs  * Therapeutic exercise to improve motor endurance, ROM, and functional strength for ADLs/functional transfers  * Therapeutic activities to facilitate/challenge dynamic balance, stand tolerance for increased safety and  Instruction/training on energy conservation/work simplification for completion of ADLs: LB AE, techniques to increase independence with self-care ADLs and IADLs, work simplification to improve endurance   Proper Positioning/Alignment: for optimal healing, skin integrity, to prevent breakdown, decrease edema, and reduce risk of contracture.  Skilled Monitoring of Vitals: to include BP, spO2, and HR throughout session to maximize safety.  Sitting/Standing Balance/Tolerance: to increase balance and activity tolerance during ADLs and facilitate proper posture and positioning.      Rehab Potential: Good for established goals     Patient / Family Goal: to return to PLOF  \"Be able to move again.\"    Patient and/or family were instructed on functional diagnosis, prognosis/goals and OT plan of care. Demonstrated good understanding.     Eval Complexity: Low    Time In: 800  Time Out: 830  Total Treatment Time: 15 min    Min Units   OT Eval Low 40071 X      OT Eval Medium 50680      OT Eval High 80560      OT Re-Eval 41512       Therapeutic Ex 81047       Therapeutic Activities 17277       ADL/Self Care 80228 15 1    Orthotic Management 59023       Manual 84167     Neuro Re-Ed 77144       Non-Billable Time          Evaluation Time additionally includes thorough review of current medical information, gathering information on past medical history/social history and prior level of function, interpretation of standardized testing/informal observation of tasks, assessment of data and development of plan of care and goals.        Idalmis Asif OTR/L 04523
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
## 2024-09-03 NOTE — BRIEF OP NOTE
Brief Postoperative Note      Patient: Natasha Joe  YOB: 1975  MRN: [id number]    Date of Procedure: 9/3/2024    Pre-Op Diagnosis Codes:      * Choledocholithiasis [K80.50]    Post-Op Diagnosis: Same        Procedure(s):  ENDOSCOPIC RETROGRADE CHOLANGIOPANCREATOGRAPHY STONE REMOVAL  ENDOSCOPIC RETROGRADE CHOLANGIOPANCREATOGRAPHY STENT INSERTION  ENDOSCOPIC RETROGRADE CHOLANGIOPANCREATOGRAPHY SPHINCTER/PAPILLOTOMY    Surgeon(s):  Matheus Umana MD    Assistant:  * No surgical staff found *    Anesthesia: Monitor Anesthesia Care    Estimated Blood Loss (mL): less than 50     Complications: None    Specimens:   * No specimens in log *    Implants:  Implant Name Type Inv. Item Serial No.  Lot No. LRB No. Used Action   SYSTEM DEL 10FR PANCREAS NAVIFLEX RX - [id number]  SYSTEM DEL 10FR PANCREAS NAVIFLEX RX  [employer] [id number] N/A 1 Implanted   STENT BILI 10FR L7CM PLAS DBL PGTL SGL ADVANIX - [id number]  STENT BILI 10FR L7CM PLAS DBL PGTL SGL ADVANIX  [employer] [id number] N/A 1 Implanted         Drains:   Urinary Catheter 09/01/24 (Active)   $ Urethral catheter insertion $ Not inserted for procedure 09/01/24 0104   Catheter Indications Urinary retention (acute or chronic), continuous bladder irrigation or bladder outlet obstruction 09/03/24 1412   Site Assessment Moist 09/03/24 1412   Urine Color Yellow 09/03/24 1412   Urine Appearance Clear 09/03/24 1412   Urine Odor Malodorous 09/03/24 0000   Collection Container Standard 09/03/24 1412   Securement Method Leg strap 09/03/24 1412   Catheter Care  Soap and water 09/03/24 1412   Catheter Best Practices  Drainage tube clipped to bed;Catheter secured to thigh;Tamper seal intact;Bag below bladder;Bag not on floor;Lack of dependent loop in tubing;Drainage bag less than half full 09/03/24 0715   Status Draining 09/03/24 1412   Output (mL) 775 mL 09/03/24 1412       Findings:    Cholangiogram with

## 2024-09-04 ENCOUNTER — ANESTHESIA EVENT (OUTPATIENT)
Dept: OPERATING ROOM | Age: 49
End: 2024-09-04
Payer: MEDICAID

## 2024-09-04 LAB
ABO + RH BLD: NORMAL
ALBUMIN SERPL-MCNC: 3 G/DL (ref 3.5–5.2)
ALDOLASE SERPL-CCNC: 32.8 U/L (ref 1.2–7.6)
ALP SERPL-CCNC: 139 U/L (ref 35–104)
ALT SERPL-CCNC: 110 U/L (ref 0–32)
ANION GAP SERPL CALCULATED.3IONS-SCNC: 11 MMOL/L (ref 7–16)
ARM BAND NUMBER: NORMAL
AST SERPL-CCNC: 80 U/L (ref 0–31)
BILIRUB DIRECT SERPL-MCNC: 0.2 MG/DL (ref 0–0.3)
BILIRUB INDIRECT SERPL-MCNC: 0.4 MG/DL (ref 0–1)
BILIRUB SERPL-MCNC: 0.6 MG/DL (ref 0–1.2)
BLOOD BANK SAMPLE EXPIRATION: NORMAL
BLOOD GROUP ANTIBODIES SERPL: NEGATIVE
BUN SERPL-MCNC: 11 MG/DL (ref 6–20)
CALCIUM SERPL-MCNC: 8.8 MG/DL (ref 8.6–10.2)
CHLORIDE SERPL-SCNC: 112 MMOL/L (ref 98–107)
CO2 SERPL-SCNC: 19 MMOL/L (ref 22–29)
CREAT SERPL-MCNC: 0.9 MG/DL (ref 0.5–1)
ERYTHROCYTE [DISTWIDTH] IN BLOOD BY AUTOMATED COUNT: 14 % (ref 11.5–15)
GFR, ESTIMATED: 75 ML/MIN/1.73M2
GLUCOSE SERPL-MCNC: 105 MG/DL (ref 74–99)
HCT VFR BLD AUTO: 27 % (ref 34–48)
HGB BLD-MCNC: 8.9 G/DL (ref 11.5–15.5)
MAGNESIUM SERPL-MCNC: 1.6 MG/DL (ref 1.6–2.6)
MCH RBC QN AUTO: 31.8 PG (ref 26–35)
MCHC RBC AUTO-ENTMCNC: 33 G/DL (ref 32–34.5)
MCV RBC AUTO: 96.4 FL (ref 80–99.9)
MYOGLOBIN UR-MCNC: <1 MG/L (ref 0–1)
PHOSPHATE SERPL-MCNC: 2.8 MG/DL (ref 2.5–4.5)
PLATELET # BLD AUTO: 264 K/UL (ref 130–450)
PMV BLD AUTO: 11.5 FL (ref 7–12)
POTASSIUM SERPL-SCNC: 3.5 MMOL/L (ref 3.5–5)
PROT SERPL-MCNC: 5.4 G/DL (ref 6.4–8.3)
RBC # BLD AUTO: 2.8 M/UL (ref 3.5–5.5)
SODIUM SERPL-SCNC: 142 MMOL/L (ref 132–146)
WBC OTHER # BLD: 10.8 K/UL (ref 4.5–11.5)

## 2024-09-04 PROCEDURE — 83735 ASSAY OF MAGNESIUM: CPT

## 2024-09-04 PROCEDURE — 86901 BLOOD TYPING SEROLOGIC RH(D): CPT

## 2024-09-04 PROCEDURE — 6370000000 HC RX 637 (ALT 250 FOR IP): Performed by: STUDENT IN AN ORGANIZED HEALTH CARE EDUCATION/TRAINING PROGRAM

## 2024-09-04 PROCEDURE — 80053 COMPREHEN METABOLIC PANEL: CPT

## 2024-09-04 PROCEDURE — 2580000003 HC RX 258

## 2024-09-04 PROCEDURE — 99232 SBSQ HOSP IP/OBS MODERATE 35: CPT | Performed by: NURSE PRACTITIONER

## 2024-09-04 PROCEDURE — 6370000000 HC RX 637 (ALT 250 FOR IP)

## 2024-09-04 PROCEDURE — 6360000002 HC RX W HCPCS

## 2024-09-04 PROCEDURE — 99232 SBSQ HOSP IP/OBS MODERATE 35: CPT | Performed by: CLINICAL NURSE SPECIALIST

## 2024-09-04 PROCEDURE — 6360000002 HC RX W HCPCS: Performed by: INTERNAL MEDICINE

## 2024-09-04 PROCEDURE — 2580000003 HC RX 258: Performed by: INTERNAL MEDICINE

## 2024-09-04 PROCEDURE — 86900 BLOOD TYPING SEROLOGIC ABO: CPT

## 2024-09-04 PROCEDURE — 86850 RBC ANTIBODY SCREEN: CPT

## 2024-09-04 PROCEDURE — 36415 COLL VENOUS BLD VENIPUNCTURE: CPT

## 2024-09-04 PROCEDURE — 99232 SBSQ HOSP IP/OBS MODERATE 35: CPT

## 2024-09-04 PROCEDURE — 85027 COMPLETE CBC AUTOMATED: CPT

## 2024-09-04 PROCEDURE — 99233 SBSQ HOSP IP/OBS HIGH 50: CPT | Performed by: SURGERY

## 2024-09-04 PROCEDURE — 6370000000 HC RX 637 (ALT 250 FOR IP): Performed by: INTERNAL MEDICINE

## 2024-09-04 PROCEDURE — 84100 ASSAY OF PHOSPHORUS: CPT

## 2024-09-04 PROCEDURE — 82248 BILIRUBIN DIRECT: CPT

## 2024-09-04 PROCEDURE — 2060000000 HC ICU INTERMEDIATE R&B

## 2024-09-04 RX ORDER — SUMATRIPTAN 50 MG/1
50 TABLET, FILM COATED ORAL 2 TIMES DAILY PRN
Status: CANCELLED | DISCHARGE
Start: 2024-09-04

## 2024-09-04 RX ORDER — LEVETIRACETAM 500 MG/5ML
500 INJECTION, SOLUTION, CONCENTRATE INTRAVENOUS EVERY 12 HOURS
Status: CANCELLED | DISCHARGE
Start: 2024-09-04

## 2024-09-04 RX ORDER — BACLOFEN 10 MG/1
20 TABLET ORAL EVERY 8 HOURS PRN
Status: DISCONTINUED | OUTPATIENT
Start: 2024-09-04 | End: 2024-09-07 | Stop reason: HOSPADM

## 2024-09-04 RX ORDER — POTASSIUM CHLORIDE 1500 MG/1
40 TABLET, EXTENDED RELEASE ORAL ONCE
Status: COMPLETED | OUTPATIENT
Start: 2024-09-04 | End: 2024-09-04

## 2024-09-04 RX ORDER — PSEUDOEPHEDRINE HCL 30 MG
100 TABLET ORAL 2 TIMES DAILY
Status: CANCELLED | DISCHARGE
Start: 2024-09-04

## 2024-09-04 RX ORDER — IBUPROFEN 400 MG/1
400 TABLET, FILM COATED ORAL EVERY 6 HOURS PRN
Status: DISCONTINUED | OUTPATIENT
Start: 2024-09-04 | End: 2024-09-07 | Stop reason: HOSPADM

## 2024-09-04 RX ORDER — SUMATRIPTAN 50 MG/1
50 TABLET, FILM COATED ORAL 2 TIMES DAILY PRN
Status: DISCONTINUED | OUTPATIENT
Start: 2024-09-04 | End: 2024-09-07 | Stop reason: HOSPADM

## 2024-09-04 RX ORDER — CLONIDINE HYDROCHLORIDE 0.2 MG/1
0.2 TABLET ORAL NIGHTLY
Status: CANCELLED | DISCHARGE
Start: 2024-09-04

## 2024-09-04 RX ORDER — BENZONATATE 100 MG/1
100 CAPSULE ORAL 3 TIMES DAILY PRN
Status: CANCELLED | DISCHARGE
Start: 2024-09-04 | End: 2024-09-11

## 2024-09-04 RX ORDER — RIMEGEPANT SULFATE 75 MG/75MG
75 TABLET, ORALLY DISINTEGRATING ORAL
Status: ON HOLD | COMMUNITY
Start: 2024-08-23 | End: 2024-09-07 | Stop reason: HOSPADM

## 2024-09-04 RX ORDER — NICOTINE 21 MG/24HR
1 PATCH, TRANSDERMAL 24 HOURS TRANSDERMAL DAILY
Status: CANCELLED | DISCHARGE
Start: 2024-09-05

## 2024-09-04 RX ADMIN — SODIUM CHLORIDE, POTASSIUM CHLORIDE, SODIUM LACTATE AND CALCIUM CHLORIDE: 600; 310; 30; 20 INJECTION, SOLUTION INTRAVENOUS at 18:03

## 2024-09-04 RX ADMIN — POTASSIUM CHLORIDE 40 MEQ: 1500 TABLET, EXTENDED RELEASE ORAL at 13:18

## 2024-09-04 RX ADMIN — IBUPROFEN 400 MG: 400 TABLET, FILM COATED ORAL at 20:52

## 2024-09-04 RX ADMIN — TRAMADOL HYDROCHLORIDE 50 MG: 50 TABLET ORAL at 11:52

## 2024-09-04 RX ADMIN — DULOXETINE HYDROCHLORIDE 60 MG: 30 CAPSULE, DELAYED RELEASE ORAL at 08:33

## 2024-09-04 RX ADMIN — PIPERACILLIN AND TAZOBACTAM 3375 MG: 3; .375 INJECTION, POWDER, LYOPHILIZED, FOR SOLUTION INTRAVENOUS at 05:10

## 2024-09-04 RX ADMIN — TOPIRAMATE 200 MG: 100 TABLET, FILM COATED ORAL at 20:52

## 2024-09-04 RX ADMIN — CLONIDINE HYDROCHLORIDE 0.2 MG: 0.1 TABLET ORAL at 20:51

## 2024-09-04 RX ADMIN — LEVOTHYROXINE SODIUM 50 MCG: 0.03 TABLET ORAL at 05:56

## 2024-09-04 RX ADMIN — BUPROPION HYDROCHLORIDE 100 MG: 100 TABLET, EXTENDED RELEASE ORAL at 08:33

## 2024-09-04 RX ADMIN — LEVETIRACETAM 500 MG: 100 INJECTION INTRAVENOUS at 20:51

## 2024-09-04 RX ADMIN — LEVETIRACETAM 500 MG: 100 INJECTION INTRAVENOUS at 08:34

## 2024-09-04 RX ADMIN — PREGABALIN 100 MG: 50 CAPSULE ORAL at 20:50

## 2024-09-04 RX ADMIN — SODIUM CHLORIDE, PRESERVATIVE FREE 10 ML: 5 INJECTION INTRAVENOUS at 20:51

## 2024-09-04 RX ADMIN — TRAMADOL HYDROCHLORIDE 50 MG: 50 TABLET ORAL at 03:28

## 2024-09-04 RX ADMIN — BUPROPION HYDROCHLORIDE 100 MG: 100 TABLET, EXTENDED RELEASE ORAL at 20:52

## 2024-09-04 RX ADMIN — PIPERACILLIN AND TAZOBACTAM 3375 MG: 3; .375 INJECTION, POWDER, LYOPHILIZED, FOR SOLUTION INTRAVENOUS at 13:20

## 2024-09-04 RX ADMIN — SODIUM CHLORIDE, PRESERVATIVE FREE 10 ML: 5 INJECTION INTRAVENOUS at 08:34

## 2024-09-04 RX ADMIN — BACLOFEN 20 MG: 10 TABLET ORAL at 20:51

## 2024-09-04 RX ADMIN — PREGABALIN 100 MG: 50 CAPSULE ORAL at 08:33

## 2024-09-04 RX ADMIN — PIPERACILLIN AND TAZOBACTAM 3375 MG: 3; .375 INJECTION, POWDER, LYOPHILIZED, FOR SOLUTION INTRAVENOUS at 21:08

## 2024-09-04 RX ADMIN — SUMATRIPTAN SUCCINATE 50 MG: 50 TABLET ORAL at 16:03

## 2024-09-04 RX ADMIN — SODIUM CHLORIDE, POTASSIUM CHLORIDE, SODIUM LACTATE AND CALCIUM CHLORIDE: 600; 310; 30; 20 INJECTION, SOLUTION INTRAVENOUS at 03:32

## 2024-09-04 ASSESSMENT — PAIN SCALES - GENERAL
PAINLEVEL_OUTOF10: 10
PAINLEVEL_OUTOF10: 9
PAINLEVEL_OUTOF10: 8
PAINLEVEL_OUTOF10: 8

## 2024-09-04 ASSESSMENT — PAIN DESCRIPTION - LOCATION
LOCATION: HEAD;NECK
LOCATION: HEAD;NECK

## 2024-09-04 ASSESSMENT — PAIN DESCRIPTION - DESCRIPTORS
DESCRIPTORS: SHARP;SHOOTING;STABBING
DESCRIPTORS: ACHING;SHARP;STABBING

## 2024-09-04 NOTE — PROGRESS NOTES
Gastroenterology, Hepatology, &  Advanced Endoscopy    Progress Note      Reason for Consult: Common bile duct stone    HPI:   Natasha Joe is a 49 y.o. female w/ PMH of  has a past medical history of History of heart valve regurgitation and Seizure (HCC). who presents to the emergency department with falling down.  The patient states that she was found unconscious in the shower by her boyfriend with many bruises on her hands and around her eyes.  She states that she has seizure and the last time she had seizure episodes was years ago.  She complains of frontal abdominal pain, 6 out of 10 in intensity, associated with mild constipation, that comes and goes and not radiating anywhere in her body.  She states that she has nausea but she did not throw up.  In the emergency department total body imagings were done to rule out bony fractures, and the CT abdomen and pelvis with IV contrast was significant for distended gallbladder with intra and extra biliary duct dilatation.  7 mm obstructing calculus was found in the distal common bile duct.    In the emergency department, labs were significant for very high creatinine kinase 10,026, , , alkaline phosphatase 264.  She was diagnosed with rhabdomyolysis and was started on IV fluid.      Recent Labs     09/02/24  2249   *   *   ALKPHOS 134*   BILITOT 0.5   BILIDIR 0.2       Lab Results   Component Value Date    WBC 8.8 09/03/2024    HGB 9.0 (L) 09/03/2024    HCT 27.1 (L) 09/03/2024     09/03/2024     09/03/2024    K 3.6 09/03/2024     (H) 09/03/2024    CREATININE 1.0 09/03/2024    BUN 12 09/03/2024    CO2 19 (L) 09/03/2024    FOLATE >20.0 09/01/2024    OCMJTKVX40 288 09/01/2024    AMMONIA 16 08/31/2024    GLUCOSE 94 09/03/2024    INR 1.3 08/31/2024    APTT 31.0 08/31/2024    TSH 0.71 09/01/2024    LABA1C 4.7 09/01/2024        MELD 3.0: 11 at 9/2/2024 10:49 PM  MELD-Na: 9 at 9/2/2024 10:49 PM  Calculated from:  Serum  counseling the patient face to face regarding findings and recommendations.     Discussed with Dr. CEJA  Plan per Dr. BRENNA Hlyton, PGY II Internal Medicine

## 2024-09-04 NOTE — PLAN OF CARE
Problem: Safety - Adult  Goal: Free from fall injury  Outcome: Progressing     Problem: Discharge Planning  Goal: Discharge to home or other facility with appropriate resources  Outcome: Progressing     Problem: Pain  Goal: Verbalizes/displays adequate comfort level or baseline comfort level  Outcome: Progressing     Problem: Confusion  Goal: Confusion, delirium, dementia, or psychosis is improved or at baseline  Description: INTERVENTIONS:  1. Assess for possible contributors to thought disturbance, including medications, impaired vision or hearing, underlying metabolic abnormalities, dehydration, psychiatric diagnoses, and notify attending LIP  2. [place] high risk fall precautions, as indicated  3. Provide frequent short contacts to provide reality reorientation, refocusing and direction  4. Decrease environmental stimuli, including noise as appropriate  5. Monitor and intervene to maintain adequate nutrition, hydration, elimination, sleep and activity  6. If unable to ensure safety without constant attention obtain sitter and review sitter guidelines with assigned personnel  7. Initiate Psychosocial CNS and Spiritual Care consult, as indicated  Outcome: Progressing

## 2024-09-04 NOTE — PROGRESS NOTES
Notified Dr. Florez, Dr. Umana, and general surgery resident pt is going to be transferred to [facility]. Awating bed assignment.

## 2024-09-04 NOTE — PROGRESS NOTES
[facility]  Nephrology Progress Note    Patient's Name: Natasha Joe    History of Present Illness from 9/1 Consult Note:    \"Natasha Joe is a 49 year old female, with past medical history of heart valve regurgitation and seizures, who presented to the ED on 8/31 s/p a fall at home and concern for seizures. Vital signs on 8/31 include temperature 98.3, RR 18, pulse 114, /88, 98% SPO2 on room air. Lab data on 8/31 includes sodium 148, potassium 3.8, chloride 113, CO2 18, BUN 21, creatinine 1.9, magnesium 2.9, lactic acid 1.1, calcium 10.4, CK 10,288, albumin 4.2, WBC 18.3, Hgb 12.6. Imaging relevant to this note from 8/31 includes a CTA of the chest, which showed no evidence of pulmonary embolism or acute pulmonary abnormality and no evidence of trauma, CT spine wo contrast, which showed no acute fracture of traumatic malalignment of the cervical spine, CT face wo contrast, which showed no acute facial fractures and bifrontal scalp hematomas, CT head wo contrast, which showed no acute intracranial abnormality and bifrontal scalp hematomas, and CT abdomen/pelvis, which showed distended gallbladder with gallbladder wall thickening and 7 mm gallstone versus polyp, intra and extra biliary duct dilatation with 7 mm obstructing calculus in the distal common bile duct, diffuse colonic fecal retention with significant stool burden, no evidence of acute trauma.  Nephrology was consulted to see the patient for SEBASTIAN and rhabdomyolysis. The patient is not known to our service.  At present, patient was seen and examined. She is lethargic, responds somewhat to verbal stimuli but is unable to answer any questions appropriately. Therefore, a review of systems was not obtained. \"    Subjective:    9/4: Patient was seen and examined.  She reports that she feels better.  She notes that she is having some abdominal pain, but denies any nausea.  She denies any shortness of breath.    PMH:    Past Medical History:  extended release tablet Take 1 tablet by mouth 2 times daily      DULoxetine (CYMBALTA) 60 MG extended release capsule Take 2 capsules by mouth daily         Allergies:    Patient has no known allergies.    Social History:         Family History:     No family history on file.    Physical Exam:      Patient Vitals for the past 24 hrs:   BP Temp Temp src Pulse Resp SpO2   09/04/24 0812 130/80 98.4 °F (36.9 °C) Temporal 84 16 93 %   09/04/24 0358 -- -- -- -- 16 --   09/04/24 0315 129/80 98.4 °F (36.9 °C) Oral 53 14 98 %   09/03/24 2315 128/87 98.3 °F (36.8 °C) Oral 92 16 97 %   09/03/24 2019 137/82 -- -- -- -- --   09/03/24 1930 137/82 98.3 °F (36.8 °C) Oral 61 18 --   09/03/24 1915 134/78 -- -- 61 22 98 %   09/03/24 1900 139/84 97.1 °F (36.2 °C) -- 68 24 98 %   09/03/24 1845 132/79 -- -- 83 19 96 %   09/03/24 1838 (!) 133/91 97.5 °F (36.4 °C) -- 91 19 98 %   09/03/24 1837 -- -- -- 97 11 96 %   09/03/24 1835 (!) 133/91 97.5 °F (36.4 °C) Temporal 97 17 98 %   09/03/24 1551 116/67 97.6 °F (36.4 °C) Oral 77 18 96 %   09/03/24 1115 130/84 97.9 °F (36.6 °C) Oral 85 16 --         Intake/Output Summary (Last 24 hours) at 9/4/2024 0943  Last data filed at 9/4/2024 0554  Gross per 24 hour   Intake 670 ml   Output 1700 ml   Net -1030 ml       General: Awake, alert, no acute distress; facial bruising  Neck: No JVD noted  Lungs: Clear bilaterally upper, diminished to the bases bilaterally.  Unlabored  CV: Regular rate and rhythm.  No rub  Abd: Soft, nontender, nondistended.  Active bowel sounds  Skin: Warm and dry.  No rash on exposed extremities  Ext: Trace BLE edema   Neuro: Awake, answers questions appropriately    Data:    Recent Labs     09/02/24 0224 09/03/24  0541 09/04/24  0635   WBC 10.1 8.8 10.8   HGB 9.2* 9.0* 8.9*   HCT 28.6* 27.1* 27.0*   MCV 95.0 96.4 96.4    261 264       Recent Labs     09/02/24 0224 09/03/24 0541 09/04/24  0635    145 142   K 3.5 3.6 3.5   * 115* 112*   CO2 20* 19* 19*  9/3  For possible cholecystectomy 9/5  On Zosyn  General Surgery and GI following    4.  Acute hyperchloremic non-anion gap metabolic acidosis  Chloride 112 and CO2 19 today  On LR  Monitor labs     5.  Hypophosphatemia  Phosphorus 2.8 today  Supplement phosphorus as needed  Monitor labs     Jimmie Hoover, APRN - CNP  Pt seen and examined with jimmie tsai on rounds  Agree with above  Cr improving  Continue ivf  Follow uo  Ej Quintero MD

## 2024-09-04 NOTE — PROGRESS NOTES
GENERAL SURGERY  DAILY PROGRESS NOTE  9/4/2024  Chief Complaint   Patient presents with    Fall     Fall at home in shower. Found at 1800 by boyfriend. Multiple bruises. On legs and arms. Bruise on Lt eye. Pt ANO x 3 at this moment.          Subjective:  Patient states she was ready for her GI procedure today. Mild epigastric abdominal pain.         Objective:  /80   Pulse 53   Temp 98.4 °F (36.9 °C) (Oral)   Resp 16   Ht 1.676 m (5' 6\")   Wt 79.4 kg (175 lb)   SpO2 98%   PF 97 L/min   BMI 28.25 kg/m²     GENERAL:  Laying in bed, awake, alert, cooperative, no apparent distress  HEAD: Normocephalic, bilateral orbital ecchymosis  EYES: No sclera icterus, pupils equal  LUNGS:  No increased work of breathing  CARDIOVASCULAR:  regular rate  ABDOMEN:  Soft, tender to the epigastrium. No guarding or rigidity.   EXTREMITIES: No edema or swelling  SKIN: ecchymosis of various ages diffusely.     Lab Results   Component Value Date    WBC 8.8 09/03/2024    HGB 9.0 (L) 09/03/2024    HCT 27.1 (L) 09/03/2024    MCV 96.4 09/03/2024     09/03/2024     Lab Results   Component Value Date     09/03/2024    K 3.6 09/03/2024     (H) 09/03/2024    CO2 19 (L) 09/03/2024    BUN 12 09/03/2024    CREATININE 1.0 09/03/2024    GLUCOSE 94 09/03/2024    CALCIUM 8.8 09/03/2024    BILITOT 0.5 09/02/2024    ALKPHOS 134 (H) 09/02/2024     (H) 09/02/2024     (H) 09/02/2024    LABGLOM 71 09/03/2024         Assessment/Plan:  49 y.o. female with CT scan findings showing choledocholithiasis of distal CBD s/p ERCP with sphincterotomy, stone sweep, stent placement    Plan  -cholecystectomy tomorrow   -prn pain and nausea control  -ok for CLD   -continue IVF   -appreciate neurology recs       Electronically signed by John Jon MD on 9/4/2024 at 7:35 AM

## 2024-09-04 NOTE — PROCEDURES
PROCEDURE NOTE  Date: 9/3/2024   Name: Natasha Joe  YOB: 1975    Procedures:    Routine EEG with video:      Method:   This recording was done using a digital EEG machine with 16 channels of EEG recording and 1 channel of EKG recording. Photic stimulation is performed as an activation procedure. The tracing captures periods of wakefulness.     Interpretation:   There is a posterior dominant rhythm of 8-9 Hz alpha activity. There is moderate amount of fronto central Beta activity seen in this recording. Photic stimulation demonstrates symmetrical occipital driving response bilaterally. There are no epileptiform abnormalities or electrographic seizures in this recording.       EKG demonstrates regular rhythm at 84 bpm.     Summary: This is essentially an unremarkable Electroencephalogram with  no evidence of epileptiform abnormalities or electrographic seizures in this recording.          Meme Wall MD

## 2024-09-04 NOTE — PROGRESS NOTES
Natasha Joe is a 49 y.o. right handed female     Patient was admitted on 8/31/2024 after falling in the shower.  She was found by her boyfriend but was unclear of the precipitating events.    She had bruises all over her body and including both eyes.    She does have a history of EMU admission where 4 events were captured at [facility] none of which were epileptic.    She was taking Lamictal 200 mg daily and Topamax 200 mg for both headaches and mood stabilization    There was a question of seizures in the past however again EMU demonstrated PNES    She also has a history of migraines and is maintained on Topamax    Scheduled for cholecystectomy tomorrow  We did discuss possible vasovagal event      Allergies as of 08/31/2024    (No Known Allergies)       Objective:     /80   Pulse 84   Temp 98.4 °F (36.9 °C) (Temporal)   Resp 16   Ht 1.676 m (5' 6\")   Wt 79.4 kg (175 lb)   SpO2 93%   PF 97 L/min   BMI 28.25 kg/m²      General appearance: alert, appears stated age and cooperative  Head: Normocephalic, without obvious abnormality, atraumatic  Extremities: no cyanosis or edema  Pulses: 2+ and symmetric  Skin: no rashes or lesions    Mental Status: Alert, oriented, thought content appropriate    Speech: clear  Language: appropriate    Cranial Nerves:  I: smell    II: visual acuity     II: visual fields Full   II: pupils EMILY   III,VII: ptosis None   III,IV,VI: extraocular muscles  EOMI without nystagmus    V: mastication Normal   V: facial light touch sensation  Normal   V,VII: corneal reflex  Present   VII: facial muscle function - upper     VII: facial muscle function - lower Normal   VIII: hearing Normal   IX: soft palate elevation  Normal   IX,X: gag reflex    XI: trapezius strength  5/5   XI: sternocleidomastoid strength 5/5   XI: neck extension strength  5/5   XII: tongue strength  Normal     Motor:  5/5 throughout  Normal bulk and tone    Sensory:  Normal to LT    Coordination:   FN symmetrical  without ataxia    DTR:   Barely elicited    No Alfaro's     Laboratory/Radiology:     CBC with Differential:    Lab Results   Component Value Date/Time    WBC 10.8 09/04/2024 06:35 AM    RBC 2.80 09/04/2024 06:35 AM    HGB 8.9 09/04/2024 06:35 AM    HCT 27.0 09/04/2024 06:35 AM     09/04/2024 06:35 AM    MCV 96.4 09/04/2024 06:35 AM    MCH 31.8 09/04/2024 06:35 AM    MCHC 33.0 09/04/2024 06:35 AM    RDW 14.0 09/04/2024 06:35 AM    LYMPHOPCT 8 08/31/2024 07:00 PM    MONOPCT 7 08/31/2024 07:00 PM    EOSPCT 0 08/31/2024 07:00 PM    BASOPCT 1 08/31/2024 07:00 PM    MONOSABS 1.22 08/31/2024 07:00 PM    LYMPHSABS 1.46 08/31/2024 07:00 PM    EOSABS 0.00 08/31/2024 07:00 PM    BASOSABS 0.13 08/31/2024 07:00 PM     CMP:    Lab Results   Component Value Date/Time     09/04/2024 06:35 AM    K 3.5 09/04/2024 06:35 AM     09/04/2024 06:35 AM    CO2 19 09/04/2024 06:35 AM    BUN 11 09/04/2024 06:35 AM    CREATININE 0.9 09/04/2024 06:35 AM    LABGLOM 75 09/04/2024 06:35 AM    GLUCOSE 105 09/04/2024 06:35 AM    CALCIUM 8.8 09/04/2024 06:35 AM    BILITOT 0.5 09/02/2024 10:49 PM    ALKPHOS 134 09/02/2024 10:49 PM     09/02/2024 10:49 PM     09/02/2024 10:49 PM       MRI Brain:  1. Limited study due to prominent patient motion artifact.  Within the  significant limits of this study, the MRI of the brain and orbits is grossly  unremarkable.  2. No evidence of mesial temporal sclerosis.  No other potentially  epileptogenic lesion is identified.  3. Small superficial hematoma in the left forehead.    EEG   This is essentially an unremarkable Electroencephalogram with no evidence of epileptiform abnormalities or electrographic seizures in this recording.     I personally reviewed the patient's lab and imaging studies at this time.    Assessment:     Patient with a history of psychogenic nonepileptic seizures diagnosed by [facility] with EMU admission and 4 spells being captured    She also has a history of

## 2024-09-04 NOTE — ACP (ADVANCE CARE PLANNING)
Advance Care Planning   Healthcare Decision Maker:    Primary Decision Maker: luther MENESES - [phone]    Click here to complete Healthcare Decision Makers including selection of the Healthcare Decision Maker Relationship (ie \"Primary\").          offered documents but declined. PHILIP Govea  9/4/2024
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
## 2024-09-04 NOTE — PROGRESS NOTES
[facility]  PROGRESS NOTE  ATTENDING NOTE    Chief Complaint   Patient presents with    Fall     Fall at home in shower. Found at 1800 by boyfriend. Multiple bruises. On legs and arms. Bruise on Lt eye. Pt ANO x 3 at this moment.      S:  50y/o F with seizures and abdominal pain.  She is being seen by neurology for the seizures.  She states she has not had a seizure in 4-5 months.  She c/o RUQ abdominal pain.    CT a/p reviewed there are stones in the CBD.  GB is dilated and has stones as well.    BP (!) 148/88   Pulse 76   Temp 98.7 °F (37.1 °C) (Temporal)   Resp 16   Ht 1.676 m (5' 6\")   Wt 79.4 kg (175 lb)   SpO2 96%   PF 97 L/min   BMI 28.25 kg/m²   Gen: NAD  Abd:  soft, ND, mild RUQ TTP  Bilateral periorbital ecchymosis.    ASSESSMENT/PLAN:  Choledocholithiasis--s/p ERCP with sphincterotomy and stent placement  --plan for lap robo polina on Thursday  The patient was advised of the risks, benefits, complications and options regarding laparoscopic cholecystectomy. This included but was not limited to bleeding, infection, damage to the bile duct or it's adjacent structures, the need for open cholecystectomy and  intraoperative cholangiogram. The patient voiced an understanding and agreed to proceed.        Lorna Choudhury MD, MSc, FACS  9/4/2024  6:25 PM

## 2024-09-05 ENCOUNTER — ANESTHESIA (OUTPATIENT)
Dept: OPERATING ROOM | Age: 49
End: 2024-09-05
Payer: MEDICAID

## 2024-09-05 LAB
ALBUMIN SERPL-MCNC: 3.1 G/DL (ref 3.5–5.2)
ALP SERPL-CCNC: 138 U/L (ref 35–104)
ALT SERPL-CCNC: 94 U/L (ref 0–32)
ANION GAP SERPL CALCULATED.3IONS-SCNC: 11 MMOL/L (ref 7–16)
AST SERPL-CCNC: 60 U/L (ref 0–31)
BILIRUB DIRECT SERPL-MCNC: 0.3 MG/DL (ref 0–0.3)
BILIRUB INDIRECT SERPL-MCNC: 0.3 MG/DL (ref 0–1)
BILIRUB SERPL-MCNC: 0.6 MG/DL (ref 0–1.2)
BUN SERPL-MCNC: 10 MG/DL (ref 6–20)
CALCIUM SERPL-MCNC: 9.3 MG/DL (ref 8.6–10.2)
CHLORIDE SERPL-SCNC: 110 MMOL/L (ref 98–107)
CK SERPL-CCNC: 1411 U/L (ref 20–180)
CO2 SERPL-SCNC: 20 MMOL/L (ref 22–29)
CREAT SERPL-MCNC: 1 MG/DL (ref 0.5–1)
ERYTHROCYTE [DISTWIDTH] IN BLOOD BY AUTOMATED COUNT: 14.1 % (ref 11.5–15)
GFR, ESTIMATED: 68 ML/MIN/1.73M2
GLUCOSE SERPL-MCNC: 82 MG/DL (ref 74–99)
HCT VFR BLD AUTO: 29.4 % (ref 34–48)
HGB BLD-MCNC: 9.5 G/DL (ref 11.5–15.5)
MCH RBC QN AUTO: 30.6 PG (ref 26–35)
MCHC RBC AUTO-ENTMCNC: 32.3 G/DL (ref 32–34.5)
MCV RBC AUTO: 94.8 FL (ref 80–99.9)
MICROORGANISM SPEC CULT: NORMAL
MICROORGANISM SPEC CULT: NORMAL
PLATELET # BLD AUTO: 288 K/UL (ref 130–450)
PMV BLD AUTO: 12.1 FL (ref 7–12)
POTASSIUM SERPL-SCNC: 3.5 MMOL/L (ref 3.5–5)
PROT SERPL-MCNC: 5.7 G/DL (ref 6.4–8.3)
PYRIDOXAL PHOS SERPL-SCNC: 35.5 NMOL/L (ref 20–125)
RBC # BLD AUTO: 3.1 M/UL (ref 3.5–5.5)
SERVICE CMNT-IMP: NORMAL
SERVICE CMNT-IMP: NORMAL
SODIUM SERPL-SCNC: 141 MMOL/L (ref 132–146)
SPECIMEN DESCRIPTION: NORMAL
SPECIMEN DESCRIPTION: NORMAL
VIT B1 PYROPHOSHATE BLD-SCNC: 243 NMOL/L (ref 70–180)
WBC OTHER # BLD: 8.6 K/UL (ref 4.5–11.5)

## 2024-09-05 PROCEDURE — 6370000000 HC RX 637 (ALT 250 FOR IP)

## 2024-09-05 PROCEDURE — 99231 SBSQ HOSP IP/OBS SF/LOW 25: CPT | Performed by: INTERNAL MEDICINE

## 2024-09-05 PROCEDURE — 6360000002 HC RX W HCPCS: Performed by: INTERNAL MEDICINE

## 2024-09-05 PROCEDURE — 82248 BILIRUBIN DIRECT: CPT

## 2024-09-05 PROCEDURE — 2500000003 HC RX 250 WO HCPCS: Performed by: ANESTHESIOLOGY

## 2024-09-05 PROCEDURE — 6370000000 HC RX 637 (ALT 250 FOR IP): Performed by: INTERNAL MEDICINE

## 2024-09-05 PROCEDURE — 7100000001 HC PACU RECOVERY - ADDTL 15 MIN: Performed by: SURGERY

## 2024-09-05 PROCEDURE — 2580000003 HC RX 258

## 2024-09-05 PROCEDURE — 6360000002 HC RX W HCPCS

## 2024-09-05 PROCEDURE — 2709999900 HC NON-CHARGEABLE SUPPLY: Performed by: SURGERY

## 2024-09-05 PROCEDURE — 7100000000 HC PACU RECOVERY - FIRST 15 MIN: Performed by: SURGERY

## 2024-09-05 PROCEDURE — 6360000002 HC RX W HCPCS: Performed by: STUDENT IN AN ORGANIZED HEALTH CARE EDUCATION/TRAINING PROGRAM

## 2024-09-05 PROCEDURE — 2500000003 HC RX 250 WO HCPCS

## 2024-09-05 PROCEDURE — 36415 COLL VENOUS BLD VENIPUNCTURE: CPT

## 2024-09-05 PROCEDURE — C1889 IMPLANT/INSERT DEVICE, NOC: HCPCS | Performed by: SURGERY

## 2024-09-05 PROCEDURE — 3700000001 HC ADD 15 MINUTES (ANESTHESIA): Performed by: SURGERY

## 2024-09-05 PROCEDURE — 2060000000 HC ICU INTERMEDIATE R&B

## 2024-09-05 PROCEDURE — 99231 SBSQ HOSP IP/OBS SF/LOW 25: CPT | Performed by: NURSE PRACTITIONER

## 2024-09-05 PROCEDURE — 2580000003 HC RX 258: Performed by: STUDENT IN AN ORGANIZED HEALTH CARE EDUCATION/TRAINING PROGRAM

## 2024-09-05 PROCEDURE — 3600000016 HC SURGERY LEVEL 6 ADDTL 15MIN: Performed by: SURGERY

## 2024-09-05 PROCEDURE — 2500000003 HC RX 250 WO HCPCS: Performed by: SURGERY

## 2024-09-05 PROCEDURE — 3600000006 HC SURGERY LEVEL 6 BASE: Performed by: SURGERY

## 2024-09-05 PROCEDURE — 3700000000 HC ANESTHESIA ATTENDED CARE: Performed by: SURGERY

## 2024-09-05 PROCEDURE — 85027 COMPLETE CBC AUTOMATED: CPT

## 2024-09-05 PROCEDURE — 82550 ASSAY OF CK (CPK): CPT

## 2024-09-05 PROCEDURE — 80053 COMPREHEN METABOLIC PANEL: CPT

## 2024-09-05 PROCEDURE — 47562 LAPAROSCOPIC CHOLECYSTECTOMY: CPT | Performed by: SURGERY

## 2024-09-05 PROCEDURE — 6360000002 HC RX W HCPCS: Performed by: SURGERY

## 2024-09-05 PROCEDURE — 8E0W4CZ ROBOTIC ASSISTED PROCEDURE OF TRUNK REGION, PERCUTANEOUS ENDOSCOPIC APPROACH: ICD-10-PCS | Performed by: STUDENT IN AN ORGANIZED HEALTH CARE EDUCATION/TRAINING PROGRAM

## 2024-09-05 PROCEDURE — 88304 TISSUE EXAM BY PATHOLOGIST: CPT

## 2024-09-05 PROCEDURE — 0FT44ZZ RESECTION OF GALLBLADDER, PERCUTANEOUS ENDOSCOPIC APPROACH: ICD-10-PCS | Performed by: STUDENT IN AN ORGANIZED HEALTH CARE EDUCATION/TRAINING PROGRAM

## 2024-09-05 DEVICE — CLIP INT M L POLYMER LOK LIG HEM O LOK: Type: IMPLANTABLE DEVICE | Site: ABDOMEN | Status: FUNCTIONAL

## 2024-09-05 RX ORDER — NALOXONE HYDROCHLORIDE 0.4 MG/ML
INJECTION, SOLUTION INTRAMUSCULAR; INTRAVENOUS; SUBCUTANEOUS PRN
Status: DISCONTINUED | OUTPATIENT
Start: 2024-09-05 | End: 2024-09-05 | Stop reason: HOSPADM

## 2024-09-05 RX ORDER — DEXAMETHASONE SODIUM PHOSPHATE 10 MG/ML
INJECTION INTRAMUSCULAR; INTRAVENOUS PRN
Status: DISCONTINUED | OUTPATIENT
Start: 2024-09-05 | End: 2024-09-05 | Stop reason: SDUPTHER

## 2024-09-05 RX ORDER — HYDROMORPHONE HYDROCHLORIDE 1 MG/ML
0.25 INJECTION, SOLUTION INTRAMUSCULAR; INTRAVENOUS; SUBCUTANEOUS EVERY 5 MIN PRN
Status: DISCONTINUED | OUTPATIENT
Start: 2024-09-05 | End: 2024-09-05 | Stop reason: HOSPADM

## 2024-09-05 RX ORDER — LIDOCAINE HYDROCHLORIDE 20 MG/ML
INJECTION, SOLUTION INTRAVENOUS PRN
Status: DISCONTINUED | OUTPATIENT
Start: 2024-09-05 | End: 2024-09-05 | Stop reason: SDUPTHER

## 2024-09-05 RX ORDER — POTASSIUM CHLORIDE 1500 MG/1
40 TABLET, EXTENDED RELEASE ORAL ONCE
Status: COMPLETED | OUTPATIENT
Start: 2024-09-05 | End: 2024-09-05

## 2024-09-05 RX ORDER — SODIUM CHLORIDE 0.9 % (FLUSH) 0.9 %
5-40 SYRINGE (ML) INJECTION EVERY 12 HOURS SCHEDULED
Status: DISCONTINUED | OUTPATIENT
Start: 2024-09-05 | End: 2024-09-05 | Stop reason: HOSPADM

## 2024-09-05 RX ORDER — SODIUM CHLORIDE 9 MG/ML
INJECTION, SOLUTION INTRAVENOUS CONTINUOUS PRN
Status: DISCONTINUED | OUTPATIENT
Start: 2024-09-05 | End: 2024-09-05 | Stop reason: SDUPTHER

## 2024-09-05 RX ORDER — ENOXAPARIN SODIUM 100 MG/ML
40 INJECTION SUBCUTANEOUS DAILY
Status: DISCONTINUED | OUTPATIENT
Start: 2024-09-05 | End: 2024-09-07 | Stop reason: HOSPADM

## 2024-09-05 RX ORDER — SODIUM CHLORIDE 9 MG/ML
INJECTION, SOLUTION INTRAVENOUS PRN
Status: DISCONTINUED | OUTPATIENT
Start: 2024-09-05 | End: 2024-09-05 | Stop reason: HOSPADM

## 2024-09-05 RX ORDER — DIPHENHYDRAMINE HYDROCHLORIDE 50 MG/ML
12.5 INJECTION INTRAMUSCULAR; INTRAVENOUS
Status: DISCONTINUED | OUTPATIENT
Start: 2024-09-05 | End: 2024-09-05 | Stop reason: HOSPADM

## 2024-09-05 RX ORDER — ESMOLOL HYDROCHLORIDE 10 MG/ML
INJECTION INTRAVENOUS PRN
Status: DISCONTINUED | OUTPATIENT
Start: 2024-09-05 | End: 2024-09-05 | Stop reason: SDUPTHER

## 2024-09-05 RX ORDER — INDOCYANINE GREEN AND WATER 25 MG
5 KIT INJECTION ONCE
Status: COMPLETED | OUTPATIENT
Start: 2024-09-05 | End: 2024-09-05

## 2024-09-05 RX ORDER — SODIUM CHLORIDE 0.9 % (FLUSH) 0.9 %
5-40 SYRINGE (ML) INJECTION PRN
Status: DISCONTINUED | OUTPATIENT
Start: 2024-09-05 | End: 2024-09-05 | Stop reason: HOSPADM

## 2024-09-05 RX ORDER — PROPOFOL 10 MG/ML
INJECTION, EMULSION INTRAVENOUS PRN
Status: DISCONTINUED | OUTPATIENT
Start: 2024-09-05 | End: 2024-09-05 | Stop reason: SDUPTHER

## 2024-09-05 RX ORDER — MIDAZOLAM HYDROCHLORIDE 1 MG/ML
INJECTION INTRAMUSCULAR; INTRAVENOUS PRN
Status: DISCONTINUED | OUTPATIENT
Start: 2024-09-05 | End: 2024-09-05 | Stop reason: SDUPTHER

## 2024-09-05 RX ORDER — FENTANYL CITRATE 50 UG/ML
INJECTION, SOLUTION INTRAMUSCULAR; INTRAVENOUS PRN
Status: DISCONTINUED | OUTPATIENT
Start: 2024-09-05 | End: 2024-09-05 | Stop reason: SDUPTHER

## 2024-09-05 RX ORDER — ROCURONIUM BROMIDE 10 MG/ML
INJECTION, SOLUTION INTRAVENOUS PRN
Status: DISCONTINUED | OUTPATIENT
Start: 2024-09-05 | End: 2024-09-05 | Stop reason: SDUPTHER

## 2024-09-05 RX ORDER — PHENYLEPHRINE HCL IN 0.9% NACL 1 MG/10 ML
SYRINGE (ML) INTRAVENOUS PRN
Status: DISCONTINUED | OUTPATIENT
Start: 2024-09-05 | End: 2024-09-05 | Stop reason: SDUPTHER

## 2024-09-05 RX ORDER — HYDROMORPHONE HYDROCHLORIDE 1 MG/ML
0.5 INJECTION, SOLUTION INTRAMUSCULAR; INTRAVENOUS; SUBCUTANEOUS EVERY 5 MIN PRN
Status: COMPLETED | OUTPATIENT
Start: 2024-09-05 | End: 2024-09-05

## 2024-09-05 RX ORDER — MEPERIDINE HYDROCHLORIDE 25 MG/ML
12.5 INJECTION INTRAMUSCULAR; INTRAVENOUS; SUBCUTANEOUS EVERY 5 MIN PRN
Status: DISCONTINUED | OUTPATIENT
Start: 2024-09-05 | End: 2024-09-05 | Stop reason: HOSPADM

## 2024-09-05 RX ORDER — BUPIVACAINE HYDROCHLORIDE 5 MG/ML
INJECTION, SOLUTION EPIDURAL; INTRACAUDAL PRN
Status: DISCONTINUED | OUTPATIENT
Start: 2024-09-05 | End: 2024-09-05 | Stop reason: ALTCHOICE

## 2024-09-05 RX ORDER — ONDANSETRON 2 MG/ML
INJECTION INTRAMUSCULAR; INTRAVENOUS PRN
Status: DISCONTINUED | OUTPATIENT
Start: 2024-09-05 | End: 2024-09-05 | Stop reason: SDUPTHER

## 2024-09-05 RX ORDER — MAGNESIUM SULFATE IN WATER 40 MG/ML
2000 INJECTION, SOLUTION INTRAVENOUS ONCE
Status: COMPLETED | OUTPATIENT
Start: 2024-09-05 | End: 2024-09-05

## 2024-09-05 RX ADMIN — LIDOCAINE HYDROCHLORIDE 100 MG: 20 INJECTION, SOLUTION INTRAVENOUS at 13:46

## 2024-09-05 RX ADMIN — IBUPROFEN 400 MG: 400 TABLET, FILM COATED ORAL at 05:19

## 2024-09-05 RX ADMIN — TOPIRAMATE 200 MG: 100 TABLET, FILM COATED ORAL at 20:38

## 2024-09-05 RX ADMIN — HYDROMORPHONE HYDROCHLORIDE 0.5 MG: 1 INJECTION, SOLUTION INTRAMUSCULAR; INTRAVENOUS; SUBCUTANEOUS at 16:18

## 2024-09-05 RX ADMIN — MAGNESIUM SULFATE HEPTAHYDRATE 2000 MG: 40 INJECTION, SOLUTION INTRAVENOUS at 18:51

## 2024-09-05 RX ADMIN — ROCURONIUM BROMIDE 40 MG: 10 INJECTION, SOLUTION INTRAVENOUS at 13:46

## 2024-09-05 RX ADMIN — LEVOTHYROXINE SODIUM 50 MCG: 0.03 TABLET ORAL at 05:19

## 2024-09-05 RX ADMIN — SUGAMMADEX 200 MG: 100 INJECTION, SOLUTION INTRAVENOUS at 15:25

## 2024-09-05 RX ADMIN — PREGABALIN 100 MG: 50 CAPSULE ORAL at 20:38

## 2024-09-05 RX ADMIN — PIPERACILLIN AND TAZOBACTAM 3375 MG: 3; .375 INJECTION, POWDER, LYOPHILIZED, FOR SOLUTION INTRAVENOUS at 20:52

## 2024-09-05 RX ADMIN — ESMOLOL HYDROCHLORIDE 20 MG: 10 INJECTION, SOLUTION INTRAVENOUS at 15:29

## 2024-09-05 RX ADMIN — FENTANYL CITRATE 50 MCG: 0.05 INJECTION, SOLUTION INTRAMUSCULAR; INTRAVENOUS at 14:51

## 2024-09-05 RX ADMIN — POTASSIUM CHLORIDE 40 MEQ: 1500 TABLET, EXTENDED RELEASE ORAL at 17:42

## 2024-09-05 RX ADMIN — HYDROMORPHONE HYDROCHLORIDE 0.5 MG: 1 INJECTION, SOLUTION INTRAMUSCULAR; INTRAVENOUS; SUBCUTANEOUS at 16:39

## 2024-09-05 RX ADMIN — MIDAZOLAM 1 MG: 1 INJECTION INTRAMUSCULAR; INTRAVENOUS at 13:41

## 2024-09-05 RX ADMIN — MIDAZOLAM 1 MG: 1 INJECTION INTRAMUSCULAR; INTRAVENOUS at 13:45

## 2024-09-05 RX ADMIN — ESMOLOL HYDROCHLORIDE 20 MG: 10 INJECTION, SOLUTION INTRAVENOUS at 14:15

## 2024-09-05 RX ADMIN — SODIUM CHLORIDE, POTASSIUM CHLORIDE, SODIUM LACTATE AND CALCIUM CHLORIDE: 600; 310; 30; 20 INJECTION, SOLUTION INTRAVENOUS at 06:22

## 2024-09-05 RX ADMIN — SUMATRIPTAN SUCCINATE 50 MG: 50 TABLET ORAL at 17:42

## 2024-09-05 RX ADMIN — LEVETIRACETAM 500 MG: 100 INJECTION INTRAVENOUS at 20:39

## 2024-09-05 RX ADMIN — SODIUM CHLORIDE: 9 INJECTION, SOLUTION INTRAVENOUS at 15:03

## 2024-09-05 RX ADMIN — FENTANYL CITRATE 100 MCG: 0.05 INJECTION, SOLUTION INTRAMUSCULAR; INTRAVENOUS at 13:46

## 2024-09-05 RX ADMIN — PIPERACILLIN AND TAZOBACTAM 3375 MG: 3; .375 INJECTION, POWDER, LYOPHILIZED, FOR SOLUTION INTRAVENOUS at 05:08

## 2024-09-05 RX ADMIN — FENTANYL CITRATE 50 MCG: 0.05 INJECTION, SOLUTION INTRAMUSCULAR; INTRAVENOUS at 14:19

## 2024-09-05 RX ADMIN — DEXAMETHASONE SODIUM PHOSPHATE 10 MG: 10 INJECTION INTRAMUSCULAR; INTRAVENOUS at 13:56

## 2024-09-05 RX ADMIN — ONDANSETRON 4 MG: 2 INJECTION INTRAMUSCULAR; INTRAVENOUS at 15:05

## 2024-09-05 RX ADMIN — BACLOFEN 20 MG: 10 TABLET ORAL at 17:42

## 2024-09-05 RX ADMIN — CLONIDINE HYDROCHLORIDE 0.2 MG: 0.1 TABLET ORAL at 20:38

## 2024-09-05 RX ADMIN — SODIUM CHLORIDE, PRESERVATIVE FREE 10 ML: 5 INJECTION INTRAVENOUS at 20:53

## 2024-09-05 RX ADMIN — PROPOFOL 150 MG: 10 INJECTION, EMULSION INTRAVENOUS at 13:46

## 2024-09-05 RX ADMIN — INDOCYANINE GREEN AND WATER 5 MG: KIT at 13:20

## 2024-09-05 RX ADMIN — Medication 50 MCG: at 15:07

## 2024-09-05 RX ADMIN — LEVETIRACETAM 500 MG: 100 INJECTION INTRAVENOUS at 08:11

## 2024-09-05 RX ADMIN — SODIUM CHLORIDE: 9 INJECTION, SOLUTION INTRAVENOUS at 14:08

## 2024-09-05 RX ADMIN — Medication 30 MG: at 13:46

## 2024-09-05 RX ADMIN — BACLOFEN 20 MG: 10 TABLET ORAL at 05:19

## 2024-09-05 RX ADMIN — FENTANYL CITRATE 50 MCG: 0.05 INJECTION, SOLUTION INTRAMUSCULAR; INTRAVENOUS at 14:28

## 2024-09-05 RX ADMIN — CEFAZOLIN 2000 MG: 2 INJECTION, POWDER, FOR SOLUTION INTRAMUSCULAR; INTRAVENOUS at 13:45

## 2024-09-05 RX ADMIN — BUPROPION HYDROCHLORIDE 100 MG: 100 TABLET, EXTENDED RELEASE ORAL at 20:38

## 2024-09-05 ASSESSMENT — PAIN DESCRIPTION - LOCATION
LOCATION: ABDOMEN
LOCATION: HEAD;NECK
LOCATION: ABDOMEN
LOCATION: ABDOMEN

## 2024-09-05 ASSESSMENT — PAIN SCALES - GENERAL
PAINLEVEL_OUTOF10: 8
PAINLEVEL_OUTOF10: 8
PAINLEVEL_OUTOF10: 9
PAINLEVEL_OUTOF10: 8
PAINLEVEL_OUTOF10: 5
PAINLEVEL_OUTOF10: 8

## 2024-09-05 ASSESSMENT — PAIN DESCRIPTION - DESCRIPTORS
DESCRIPTORS: ACHING;DISCOMFORT;SORE
DESCRIPTORS: ACHING;DISCOMFORT
DESCRIPTORS: ACHING;DISCOMFORT
DESCRIPTORS: SHARP;SHOOTING;STABBING
DESCRIPTORS: ACHING;DISCOMFORT
DESCRIPTORS: DISCOMFORT;ACHING

## 2024-09-05 ASSESSMENT — PAIN DESCRIPTION - ONSET
ONSET: ON-GOING

## 2024-09-05 ASSESSMENT — PAIN DESCRIPTION - PAIN TYPE
TYPE: SURGICAL PAIN
TYPE: SURGICAL PAIN;ACUTE PAIN

## 2024-09-05 ASSESSMENT — PAIN DESCRIPTION - FREQUENCY
FREQUENCY: INTERMITTENT

## 2024-09-05 NOTE — PROGRESS NOTES
Pt's telemetry box accompanied pt to OR. Box is currently with pt's chart and expected to be sent back to floor following procedure .

## 2024-09-05 NOTE — PROGRESS NOTES
Patient got a bed at [facility]. Message sent to Dr Sevilla. Per Dr Sevilla patient will be discharged tomorrow and no need to transfer. Access center notified

## 2024-09-05 NOTE — OP NOTE
Operative Note      Patient: Natasha Joe  YOB: 1975  MRN: [id number]    Date of Procedure: 9/5/2024    Pre-Op Diagnosis Codes:      * Choledocholithiasis [K80.50]    Post-Op Diagnosis: Same       Procedure(s):  CHOLECYSTECTOMY LAPAROSCOPIC ROBOTIC XI    Surgeon(s):  Lorna Choudhury MD    Assistant:   Resident: John Jon MD    Anesthesia: General    Estimated Blood Loss (mL): Minimal    Complications: None    Specimens:   ID Type Source Tests Collected by Time Destination   A : Gallbladder and Contents Tissue Tissue SURGICAL PATHOLOGY Lorna Choudhury MD 9/5/2024 1504        Implants:  Implant Name Type Inv. Item Serial No.  Lot No. LRB No. Used Action   CLIP INT M L POLYMER [employer] LIG HEM O [employer] - [id number]  CLIP INT M L POLYMER [employer] LIG HEM O [employer]  [employer] [id number] Right 1 Implanted   CLIP INT M L POLYMER [employer] LIG HEM O [employer] - [id number]  CLIP INT M L POLYMER [employer] LIG HEM O [employer]  [employer]- [id number] Right 1 Implanted         Drains:   [REMOVED] Urinary Catheter 09/01/24 (Removed)   $ Urethral catheter insertion $ Not inserted for procedure 09/03/24 2329   Catheter Indications Urinary retention (acute or chronic), continuous bladder irrigation or bladder outlet obstruction 09/03/24 2329   Site Assessment Moist 09/03/24 1925   Urine Color Yellow 09/03/24 2329   Urine Appearance Clear 09/03/24 2329   Urine Odor Malodorous 09/03/24 0000   Collection Container Standard 09/03/24 2329   Securement Method Leg strap 09/03/24 2329   Catheter Care  Soap and water 09/03/24 2019   Catheter Best Practices  Drainage tube clipped to bed;Catheter secured to thigh;Tamper seal intact;Bag below bladder;Bag not on floor;Lack of dependent loop in tubing;Drainage bag less than half full 09/03/24 1925   Status Draining;Patent 09/03/24 2329   Output (mL) 375 mL 09/04/24 0554       Findings:  Infection Present At Time Of Surgery (PATOS) (choose all levels that have infection  Blunt dissection and electrocautery were used identify the cystic duct and cystic artery.  These were circumferentially dissected along their course.  The critical view was obtained. The cystic duct was clipped x2 proximally and x1 distally. This was divided with scissors.  Cystic artery was also clipped and divided. Electrocautery and blunt dissection were used to dissect the gallbladder off the liver bed.  Hemostasis ensured with electrocautery.  Clips were again visualized and remained intact without any evidence of bile or bleeding.  Gallbladder was placed in a bag and removed via the most lateral RUQ port site and sent for specimen. The fascia was closed with 0 vicryl using the gem-mulugeta device. The port sites were closed with 4-0 suture in subcuticular fashion.  Derma bond was applied.  Patient tolerated procedure well and was transferred PACU in stable condition. All counts were correct.    Needle and lap counts were correct x2.      Dr. Choudhury was present and scrubbed for the entire procedure.                      Electronically signed by John Jon MD on 9/5/2024 at 3:40 PM

## 2024-09-05 NOTE — PROGRESS NOTES
HOSPITALIST PROGRESS NOTES     ADMITTING DATE AND TIME: 8/31/2024  6:44 PM  had concerns including Fall (Fall at home in shower. Found at 1800 by boyfriend. Multiple bruises. On legs and arms. Bruise on Lt eye. Pt ANO x 3 at this moment. ).    ADMIT DX: Elevated CK [R74.8]      SUBJECTIVE:  Patient is being followed for Elevated CK [R74.8]     Patient was seen examined and evaluated  Recent lab results, charts and pertinent diagnostic imaging reviewed   Ancillary service notes reviewed   Off the floor for cholecystectomy     Care reviewed with nursing staff, medical and surgical specialty care, primary care and the patient's family as available.      topiramate  200 mg Oral Nightly    nicotine  1 patch TransDERmal Daily    piperacillin-tazobactam  3,375 mg IntraVENous Q8H    buPROPion  100 mg Oral BID    DULoxetine  60 mg Oral Daily    levothyroxine  50 mcg Oral Daily    pregabalin  100 mg Oral BID    levETIRAcetam  500 mg IntraVENous Q12H    sodium chloride flush  5-40 mL IntraVENous 2 times per day    [Held by provider] enoxaparin  40 mg SubCUTAneous Daily    cloNIDine  0.2 mg Oral Nightly    polyethylene glycol  17 g Oral Daily    docusate sodium  100 mg Oral BID     SUMAtriptan, 50 mg, BID PRN  baclofen, 20 mg, Q8H PRN  ibuprofen, 400 mg, Q6H PRN  traMADol, 50 mg, Q6H PRN  LORazepam, 1 mg, Q6H PRN  benzocaine-menthol, 1 lozenge, Q2H PRN  benzonatate, 100 mg, TID PRN  calcium carbonate, 500 mg, TID PRN  hydrALAZINE, 10 mg, Q6H PRN  Polyvinyl Alcohol-Povidone PF, 1 drop, PRN  sodium chloride, 1 spray, PRN  sodium chloride flush, 5-40 mL, PRN  sodium chloride, , PRN  potassium chloride, 40 mEq, PRN   Or  potassium alternative oral replacement, 40 mEq, PRN   Or  potassium chloride, 10 mEq, PRN  magnesium sulfate, 2,000 mg, PRN  ondansetron, 4 mg, Q8H PRN   Or  ondansetron, 4  mg, Q6H PRN  polyethylene glycol, 17 g, Daily PRN  acetaminophen, 650 mg, Q6H PRN   Or  acetaminophen, 650 mg, Q6H PRN         OBJECTIVE:    /83   Pulse 92   Temp 98 °F (36.7 °C)   Resp 16   Ht 1.676 m (5' 6\")   Wt 79.8 kg (176 lb)   SpO2 95%   PF 97 L/min   BMI 28.41 kg/m²       Recent Labs     09/03/24  0541 09/04/24  0635    142   K 3.6 3.5   * 112*   CO2 19* 19*   BUN 12 11   CREATININE 1.0 0.9   GLUCOSE 94 105*   CALCIUM 8.8 8.8       Recent Labs     09/03/24  0541 09/04/24  0635 09/05/24  0528   WBC 8.8 10.8 8.6   RBC 2.81* 2.80* 3.10*   HGB 9.0* 8.9* 9.5*   HCT 27.1* 27.0* 29.4*   MCV 96.4 96.4 94.8   MCH 32.0 31.8 30.6   MCHC 33.2 33.0 32.3   RDW 14.2 14.0 14.1    264 288   MPV 11.5 11.5 12.1*       I/O last 3 completed shifts:  In: 170 [P.O.:120; I.V.:50]  Out: 1225 [Urine:1225]  No intake/output data recorded.    No results found for this or any previous visit.      SYNOPSIS: 49-year-old female with past medical history that includes seizure, DDD, presented to ER with a fall.  She was found by her boyfriend in the shower with multiple bruises. Unsure if she lost consciousness or had a seizure prior to falling.  She is unsure whether she hit her head.  She was complaining of neck pain.  She has extensive bruising. She does have a history of previous seizures but is not on any medication for seizures.  Per EMS she was having intermittent confusion.  In the ER, she was found to have SEBASTIAN along with elevated CK at 10,288.  On imaging she was found to have choledocholithiasis General Surgery and GI were consulted and the patient was admitted to the hospital for further care. In the ER, routine labwork was performed which revealed sodium 148, chloride of 113, CO2 18, BUN 21, creatinine 1.9, anion gap 17, CK 10,288, troponin 19, alk phos 264, , .                                           ASSESSMENT AND PLAN:    Principal Problem:    Seizure (HCC)  Active Problems:

## 2024-09-05 NOTE — PROGRESS NOTES
Resident notified that pt not ready to sign surgical consent. States that they will discuss with her during morning rounds.

## 2024-09-05 NOTE — PROGRESS NOTES
Gastroenterology, Hepatology, &  Advanced Endoscopy    Progress Note      Reason for Consult: Common bile duct stone    HPI:   Natasha Joe is a 49 y.o. female w/ PMH of  has a past medical history of History of heart valve regurgitation and Seizure (HCC). who presents to the emergency department with falling down.  The patient states that she was found unconscious in the shower by her boyfriend with many bruises on her hands and around her eyes.  She states that she has seizure and the last time she had seizure episodes was years ago.  She complains of frontal abdominal pain, 6 out of 10 in intensity, associated with mild constipation, that comes and goes and not radiating anywhere in her body.  She states that she has nausea but she did not throw up.  In the emergency department total body imagings were done to rule out bony fractures, and the CT abdomen and pelvis with IV contrast was significant for distended gallbladder with intra and extra biliary duct dilatation.  7 mm obstructing calculus was found in the distal common bile duct.    In the emergency department, labs were significant for very high creatinine kinase 10,026, , , alkaline phosphatase 264.  She was diagnosed with rhabdomyolysis and was started on IV fluid.      Recent Labs     09/02/24  2249 09/04/24  0635   * 110*   * 80*   ALKPHOS 134* 139*   BILITOT 0.5 0.6   BILIDIR 0.2 0.2       Lab Results   Component Value Date    WBC 8.6 09/05/2024    HGB 9.5 (L) 09/05/2024    HCT 29.4 (L) 09/05/2024     09/05/2024     09/04/2024    K 3.5 09/04/2024     (H) 09/04/2024    CREATININE 0.9 09/04/2024    BUN 11 09/04/2024    CO2 19 (L) 09/04/2024    FOLATE >20.0 09/01/2024    CAFQRFJV23 288 09/01/2024    AMMONIA 16 08/31/2024    GLUCOSE 105 (H) 09/04/2024    INR 1.3 08/31/2024    APTT 31.0 08/31/2024    TSH 0.71 09/01/2024    LABA1C 4.7 09/01/2024        MELD 3.0: 11 at 9/2/2024 10:49 PM  MELD-Na: 9 at

## 2024-09-05 NOTE — PLAN OF CARE
Problem: Safety - Adult  Goal: Free from fall injury  Outcome: Progressing     Problem: Discharge Planning  Goal: Discharge to home or other facility with appropriate resources  Outcome: Progressing     Problem: Pain  Goal: Verbalizes/displays adequate comfort level or baseline comfort level  Outcome: Progressing     Problem: Confusion  Goal: Confusion, delirium, dementia, or psychosis is improved or at baseline  Description: INTERVENTIONS:  1. Assess for possible contributors to thought disturbance, including medications, impaired vision or hearing, underlying metabolic abnormalities, dehydration, psychiatric diagnoses, and notify attending LIP  2. [place] high risk fall precautions, as indicated  3. Provide frequent short contacts to provide reality reorientation, refocusing and direction  4. Decrease environmental stimuli, including noise as appropriate  5. Monitor and intervene to maintain adequate nutrition, hydration, elimination, sleep and activity  6. If unable to ensure safety without constant attention obtain sitter and review sitter guidelines with assigned personnel  7. Initiate Psychosocial CNS and Spiritual Care consult, as indicated  Outcome: Progressing

## 2024-09-05 NOTE — CARE COORDINATION
SOCIAL WORK/CASEMANAGEMENT TRANSITION OF CARE PLANNING( VANDANA LAZO, [phone]): pt is for aidee dennis fish today with General Surgery today. I met with pt and offered hhc and she is not sure. She wants to wait to see how she does after surgery. Will follow up in a.m. she is also not sure if she is going home or to stay with her boyfriend on discharge. Vandana Lazo, PHILIP  9/5/2024

## 2024-09-05 NOTE — ANESTHESIA PRE PROCEDURE
fall      Cervical stenosis   C-spine fixation. Limited neck extension GI/Hepatic/Renal:   (+) renal disease (SEBASTIAN):         ROS comment: cholelithiasis.   Endo/Other:    (+) hypothyroidism, blood dyscrasia: anemia:., electrolyte abnormalities.                 Abdominal:             Vascular: negative vascular ROS.         Other Findings:         Anesthesia Plan      general     ASA 3       Induction: intravenous.    MIPS: Postoperative opioids intended and Prophylactic antiemetics administered.  Anesthetic plan and risks discussed with patient.      Plan discussed with CRNA.                  Sarthak Holcomb MD   9/5/2024

## 2024-09-05 NOTE — PROGRESS NOTES
GENERAL SURGERY  DAILY PROGRESS NOTE  9/5/2024  Chief Complaint   Patient presents with    Fall     Fall at home in shower. Found at 1800 by boyfriend. Multiple bruises. On legs and arms. Bruise on Lt eye. Pt ANO x 3 at this moment.          Subjective:  Ready for OR today. No complaints.         Objective:  /83   Pulse 92   Temp 98 °F (36.7 °C) (Temporal)   Resp 16   Ht 1.676 m (5' 6\")   Wt 79.8 kg (176 lb)   SpO2 95%   PF 97 L/min   BMI 28.41 kg/m²     GENERAL:  Laying in bed, awake, alert, cooperative, no apparent distress  HEAD: Normocephalic, bilateral orbital ecchymosis  EYES: No sclera icterus, pupils equal  LUNGS:  No increased work of breathing  CARDIOVASCULAR:  regular rate  ABDOMEN:  Soft, tender to the epigastrium. No guarding or rigidity.   EXTREMITIES: No edema or swelling  SKIN: ecchymosis of various ages diffusely.     Lab Results   Component Value Date    WBC 8.6 09/05/2024    HGB 9.5 (L) 09/05/2024    HCT 29.4 (L) 09/05/2024    MCV 94.8 09/05/2024     09/05/2024     Lab Results   Component Value Date     09/04/2024    K 3.5 09/04/2024     (H) 09/04/2024    CO2 19 (L) 09/04/2024    BUN 11 09/04/2024    CREATININE 0.9 09/04/2024    GLUCOSE 105 (H) 09/04/2024    CALCIUM 8.8 09/04/2024    BILITOT 0.6 09/04/2024    ALKPHOS 139 (H) 09/04/2024    AST 80 (H) 09/04/2024     (H) 09/04/2024    LABGLOM 75 09/04/2024         Assessment/Plan:  49 y.o. female with CT scan findings showing choledocholithiasis of distal CBD s/p ERCP with sphincterotomy, stone sweep, stent placement    Plan  -cholecystectomy today   -discussed risks, benefits and alternatives, she is agreeable to proceed   -prn pain and nausea control  -npo   -continue IVF   -appreciate neurology recs       Electronically signed by John Jon MD on 9/5/2024 at 11:59 AM

## 2024-09-06 LAB
ALBUMIN SERPL-MCNC: 3.1 G/DL (ref 3.5–5.2)
ALP SERPL-CCNC: 118 U/L (ref 35–104)
ALT SERPL-CCNC: 78 U/L (ref 0–32)
ANION GAP SERPL CALCULATED.3IONS-SCNC: 13 MMOL/L (ref 7–16)
AST SERPL-CCNC: 48 U/L (ref 0–31)
BILIRUB SERPL-MCNC: 0.4 MG/DL (ref 0–1.2)
BUN SERPL-MCNC: 15 MG/DL (ref 6–20)
CALCIUM SERPL-MCNC: 9 MG/DL (ref 8.6–10.2)
CHLORIDE SERPL-SCNC: 112 MMOL/L (ref 98–107)
CK SERPL-CCNC: 1012 U/L (ref 20–180)
CO2 SERPL-SCNC: 19 MMOL/L (ref 22–29)
CREAT SERPL-MCNC: 1 MG/DL (ref 0.5–1)
ERYTHROCYTE [DISTWIDTH] IN BLOOD BY AUTOMATED COUNT: 14 % (ref 11.5–15)
GFR, ESTIMATED: 71 ML/MIN/1.73M2
GLUCOSE SERPL-MCNC: 92 MG/DL (ref 74–99)
HCT VFR BLD AUTO: 28.3 % (ref 34–48)
HGB BLD-MCNC: 9.2 G/DL (ref 11.5–15.5)
MAGNESIUM SERPL-MCNC: 2.1 MG/DL (ref 1.6–2.6)
MCH RBC QN AUTO: 31.1 PG (ref 26–35)
MCHC RBC AUTO-ENTMCNC: 32.5 G/DL (ref 32–34.5)
MCV RBC AUTO: 95.6 FL (ref 80–99.9)
MICROORGANISM SPEC CULT: NORMAL
PHOSPHATE SERPL-MCNC: 2.7 MG/DL (ref 2.5–4.5)
PLATELET # BLD AUTO: 338 K/UL (ref 130–450)
PMV BLD AUTO: 11.6 FL (ref 7–12)
POTASSIUM SERPL-SCNC: 3.9 MMOL/L (ref 3.5–5)
PROT SERPL-MCNC: 5.5 G/DL (ref 6.4–8.3)
RBC # BLD AUTO: 2.96 M/UL (ref 3.5–5.5)
SERVICE CMNT-IMP: NORMAL
SODIUM SERPL-SCNC: 144 MMOL/L (ref 132–146)
SPECIMEN DESCRIPTION: NORMAL
WBC OTHER # BLD: 15.2 K/UL (ref 4.5–11.5)

## 2024-09-06 PROCEDURE — 6370000000 HC RX 637 (ALT 250 FOR IP)

## 2024-09-06 PROCEDURE — 6360000002 HC RX W HCPCS

## 2024-09-06 PROCEDURE — 2060000000 HC ICU INTERMEDIATE R&B

## 2024-09-06 PROCEDURE — 2580000003 HC RX 258

## 2024-09-06 PROCEDURE — 36415 COLL VENOUS BLD VENIPUNCTURE: CPT

## 2024-09-06 PROCEDURE — 97530 THERAPEUTIC ACTIVITIES: CPT

## 2024-09-06 PROCEDURE — 2580000003 HC RX 258: Performed by: INTERNAL MEDICINE

## 2024-09-06 PROCEDURE — 85027 COMPLETE CBC AUTOMATED: CPT

## 2024-09-06 PROCEDURE — 80053 COMPREHEN METABOLIC PANEL: CPT

## 2024-09-06 PROCEDURE — 99232 SBSQ HOSP IP/OBS MODERATE 35: CPT | Performed by: INTERNAL MEDICINE

## 2024-09-06 PROCEDURE — 97535 SELF CARE MNGMENT TRAINING: CPT

## 2024-09-06 PROCEDURE — 83735 ASSAY OF MAGNESIUM: CPT

## 2024-09-06 PROCEDURE — 99024 POSTOP FOLLOW-UP VISIT: CPT | Performed by: SURGERY

## 2024-09-06 PROCEDURE — 82550 ASSAY OF CK (CPK): CPT

## 2024-09-06 PROCEDURE — 84100 ASSAY OF PHOSPHORUS: CPT

## 2024-09-06 RX ORDER — SODIUM CHLORIDE, SODIUM LACTATE, POTASSIUM CHLORIDE, CALCIUM CHLORIDE 600; 310; 30; 20 MG/100ML; MG/100ML; MG/100ML; MG/100ML
INJECTION, SOLUTION INTRAVENOUS CONTINUOUS
Status: DISCONTINUED | OUTPATIENT
Start: 2024-09-06 | End: 2024-09-06

## 2024-09-06 RX ORDER — OXYCODONE HYDROCHLORIDE 5 MG/1
5 TABLET ORAL EVERY 6 HOURS PRN
Qty: 12 TABLET | Refills: 0 | Status: SHIPPED | OUTPATIENT
Start: 2024-09-06 | End: 2024-09-07 | Stop reason: HOSPADM

## 2024-09-06 RX ORDER — ACETAMINOPHEN 500 MG
500 TABLET ORAL 4 TIMES DAILY PRN
COMMUNITY
Start: 2024-09-06 | End: 2024-09-07 | Stop reason: HOSPADM

## 2024-09-06 RX ORDER — METHOCARBAMOL 500 MG/1
500 TABLET, FILM COATED ORAL 4 TIMES DAILY
Status: DISCONTINUED | OUTPATIENT
Start: 2024-09-06 | End: 2024-09-06

## 2024-09-06 RX ORDER — IBUPROFEN 200 MG
200 TABLET ORAL EVERY 6 HOURS PRN
COMMUNITY
Start: 2024-09-06 | End: 2024-09-07 | Stop reason: HOSPADM

## 2024-09-06 RX ADMIN — DOCUSATE SODIUM 100 MG: 100 CAPSULE, LIQUID FILLED ORAL at 08:13

## 2024-09-06 RX ADMIN — DOCUSATE SODIUM 100 MG: 100 CAPSULE, LIQUID FILLED ORAL at 20:11

## 2024-09-06 RX ADMIN — PIPERACILLIN AND TAZOBACTAM 3375 MG: 3; .375 INJECTION, POWDER, LYOPHILIZED, FOR SOLUTION INTRAVENOUS at 04:58

## 2024-09-06 RX ADMIN — TRAMADOL HYDROCHLORIDE 50 MG: 50 TABLET ORAL at 11:12

## 2024-09-06 RX ADMIN — TRAMADOL HYDROCHLORIDE 50 MG: 50 TABLET ORAL at 17:35

## 2024-09-06 RX ADMIN — BACLOFEN 20 MG: 10 TABLET ORAL at 22:56

## 2024-09-06 RX ADMIN — LEVETIRACETAM 500 MG: 100 INJECTION INTRAVENOUS at 08:14

## 2024-09-06 RX ADMIN — SUMATRIPTAN SUCCINATE 50 MG: 50 TABLET ORAL at 11:14

## 2024-09-06 RX ADMIN — IBUPROFEN 400 MG: 400 TABLET, FILM COATED ORAL at 08:14

## 2024-09-06 RX ADMIN — IBUPROFEN 400 MG: 400 TABLET, FILM COATED ORAL at 01:08

## 2024-09-06 RX ADMIN — SODIUM CHLORIDE, PRESERVATIVE FREE 10 ML: 5 INJECTION INTRAVENOUS at 20:12

## 2024-09-06 RX ADMIN — DULOXETINE HYDROCHLORIDE 60 MG: 30 CAPSULE, DELAYED RELEASE ORAL at 08:14

## 2024-09-06 RX ADMIN — IBUPROFEN 400 MG: 400 TABLET, FILM COATED ORAL at 20:11

## 2024-09-06 RX ADMIN — PREGABALIN 100 MG: 50 CAPSULE ORAL at 08:13

## 2024-09-06 RX ADMIN — BACLOFEN 20 MG: 10 TABLET ORAL at 05:25

## 2024-09-06 RX ADMIN — LEVOTHYROXINE SODIUM 50 MCG: 0.03 TABLET ORAL at 05:23

## 2024-09-06 RX ADMIN — BUPROPION HYDROCHLORIDE 100 MG: 100 TABLET, EXTENDED RELEASE ORAL at 08:14

## 2024-09-06 RX ADMIN — TRAMADOL HYDROCHLORIDE 50 MG: 50 TABLET ORAL at 05:23

## 2024-09-06 RX ADMIN — LEVETIRACETAM 500 MG: 100 INJECTION INTRAVENOUS at 20:12

## 2024-09-06 RX ADMIN — CLONIDINE HYDROCHLORIDE 0.2 MG: 0.1 TABLET ORAL at 20:11

## 2024-09-06 RX ADMIN — IBUPROFEN 400 MG: 400 TABLET, FILM COATED ORAL at 14:01

## 2024-09-06 RX ADMIN — SODIUM CHLORIDE, POTASSIUM CHLORIDE, SODIUM LACTATE AND CALCIUM CHLORIDE: 600; 310; 30; 20 INJECTION, SOLUTION INTRAVENOUS at 14:05

## 2024-09-06 RX ADMIN — PREGABALIN 100 MG: 50 CAPSULE ORAL at 20:11

## 2024-09-06 RX ADMIN — BACLOFEN 20 MG: 10 TABLET ORAL at 14:00

## 2024-09-06 RX ADMIN — PIPERACILLIN AND TAZOBACTAM 3375 MG: 3; .375 INJECTION, POWDER, LYOPHILIZED, FOR SOLUTION INTRAVENOUS at 14:21

## 2024-09-06 RX ADMIN — SODIUM CHLORIDE, PRESERVATIVE FREE 10 ML: 5 INJECTION INTRAVENOUS at 08:15

## 2024-09-06 RX ADMIN — TOPIRAMATE 200 MG: 100 TABLET, FILM COATED ORAL at 20:12

## 2024-09-06 ASSESSMENT — PAIN SCALES - GENERAL
PAINLEVEL_OUTOF10: 5
PAINLEVEL_OUTOF10: 7
PAINLEVEL_OUTOF10: 8
PAINLEVEL_OUTOF10: 3
PAINLEVEL_OUTOF10: 9
PAINLEVEL_OUTOF10: 9
PAINLEVEL_OUTOF10: 8
PAINLEVEL_OUTOF10: 6
PAINLEVEL_OUTOF10: 5
PAINLEVEL_OUTOF10: 2
PAINLEVEL_OUTOF10: 7
PAINLEVEL_OUTOF10: 10
PAINLEVEL_OUTOF10: 10
PAINLEVEL_OUTOF10: 9

## 2024-09-06 ASSESSMENT — PAIN DESCRIPTION - LOCATION
LOCATION: ABDOMEN
LOCATION: ABDOMEN
LOCATION: ABDOMEN;HEAD;SHOULDER
LOCATION: ABDOMEN
LOCATION: ABDOMEN;FLANK
LOCATION: NECK

## 2024-09-06 ASSESSMENT — PAIN DESCRIPTION - PAIN TYPE: TYPE: SURGICAL PAIN

## 2024-09-06 ASSESSMENT — PAIN DESCRIPTION - ORIENTATION
ORIENTATION: MID
ORIENTATION: RIGHT
ORIENTATION: RIGHT

## 2024-09-06 ASSESSMENT — PAIN DESCRIPTION - DESCRIPTORS
DESCRIPTORS: ACHING;CRAMPING;DISCOMFORT
DESCRIPTORS: ACHING
DESCRIPTORS: ACHING;CRAMPING;DISCOMFORT

## 2024-09-06 ASSESSMENT — PAIN - FUNCTIONAL ASSESSMENT
PAIN_FUNCTIONAL_ASSESSMENT: ACTIVITIES ARE NOT PREVENTED

## 2024-09-06 ASSESSMENT — PAIN DESCRIPTION - FREQUENCY: FREQUENCY: INTERMITTENT

## 2024-09-06 ASSESSMENT — PAIN DESCRIPTION - ONSET: ONSET: ON-GOING

## 2024-09-06 NOTE — ANESTHESIA POSTPROCEDURE EVALUATION
Department of Anesthesiology  Postprocedure Note    Patient: Natasha Joe  MRN: [id number]  YOB: 1975  Date of evaluation: 9/6/2024    Procedure Summary       Date: 09/05/24 Room / Location: [address]    Anesthesia Start: 1333 Anesthesia Stop: 1539    Procedure: CHOLECYSTECTOMY LAPAROSCOPIC ROBOTIC XI (Abdomen) Diagnosis:       Choledocholithiasis      (Choledocholithiasis [K80.50])    Surgeons: Lorna Choudhury MD Responsible Provider: Sarthak Holcomb MD    Anesthesia Type: General ASA Status: 3            Anesthesia Type: General    Erum Phase I: Erum Score: 8    Erum Phase II:      Anesthesia Post Evaluation    Patient location during evaluation: PACU  Patient participation: complete - patient participated  Level of consciousness: awake and alert  Airway patency: patent  Nausea & Vomiting: no nausea and no vomiting  Cardiovascular status: hemodynamically stable  Respiratory status: acceptable  Hydration status: euvolemic  Multimodal analgesia pain management approach  Pain management: adequate    No notable events documented.

## 2024-09-06 NOTE — CARE COORDINATION
SOCIAL WORK/CASEMANAGEMENT TRANSITION OF CARE PLANNING( ALBINA DURAN, PHILIP [phone]):  met with pt this a.m. discharge home today. PT saw pt this a.m. and they are going to provide tools to help her. Pt is going to stay with boyfriend that resides in [place] and she has [facility] medicaid and cannot have hhc unless she goes home. General surgery signed off. PHILIP Govea  9/6/2024

## 2024-09-06 NOTE — PROGRESS NOTES
HOSPITALIST PROGRESS NOTES     ADMITTING DATE AND TIME: 8/31/2024  6:44 PM  had concerns including Fall (Fall at home in shower. Found at 1800 by boyfriend. Multiple bruises. On legs and arms. Bruise on Lt eye. Pt ANO x 3 at this moment. ).    ADMIT DX: Elevated CK [R74.8]      SUBJECTIVE:  Patient is being followed for Elevated CK [R74.8]     Patient was seen examined and evaluated  Recent lab results, charts and pertinent diagnostic imaging reviewed   Ancillary service notes reviewed   Up in chair, layne eyes     Care reviewed with nursing staff, medical and surgical specialty care, primary care and the patient's family as available.      enoxaparin  40 mg SubCUTAneous Daily    topiramate  200 mg Oral Nightly    nicotine  1 patch TransDERmal Daily    piperacillin-tazobactam  3,375 mg IntraVENous Q8H    DULoxetine  60 mg Oral Daily    levothyroxine  50 mcg Oral Daily    pregabalin  100 mg Oral BID    levETIRAcetam  500 mg IntraVENous Q12H    sodium chloride flush  5-40 mL IntraVENous 2 times per day    cloNIDine  0.2 mg Oral Nightly    polyethylene glycol  17 g Oral Daily    docusate sodium  100 mg Oral BID     SUMAtriptan, 50 mg, BID PRN  baclofen, 20 mg, Q8H PRN  ibuprofen, 400 mg, Q6H PRN  traMADol, 50 mg, Q6H PRN  LORazepam, 1 mg, Q6H PRN  benzocaine-menthol, 1 lozenge, Q2H PRN  benzonatate, 100 mg, TID PRN  calcium carbonate, 500 mg, TID PRN  hydrALAZINE, 10 mg, Q6H PRN  Polyvinyl Alcohol-Povidone PF, 1 drop, PRN  sodium chloride, 1 spray, PRN  sodium chloride flush, 5-40 mL, PRN  sodium chloride, , PRN  potassium chloride, 40 mEq, PRN   Or  potassium alternative oral replacement, 40 mEq, PRN   Or  potassium chloride, 10 mEq, PRN  magnesium sulfate, 2,000 mg, PRN  ondansetron, 4 mg, Q8H PRN   Or  ondansetron, 4 mg, Q6H PRN  acetaminophen, 650 mg, Q6H PRN    Or  acetaminophen, 650 mg, Q6H PRN         OBJECTIVE:    /87   Pulse (!) 103   Temp 98.5 °F (36.9 °C) (Oral)   Resp 20   Ht 1.676 m (5' 6\")   Wt 73 kg (161 lb)   LMP  (Within Years) Comment: 5-6 years ago  SpO2 98%   PF 97 L/min   BMI 25.99 kg/m²     General Appearance:  Comfortable, raccoon eyes   HEENT: Normal HEENT exam.    Lungs:  Normal effort and normal respiratory rate.  Breath sounds clear to auscultation.    Heart: Normal rate.  Regular rhythm.  S1 normal and S2 normal.    Abdomen: Abdomen is soft.  Bowel sounds are normal.   There is no abdominal tenderness.     Extremities: Normal range of motion.    Pulses: Distal pulses are intact.    Neurological: Patient is alert and oriented to person, place and time.  Normal strength.    Pupils:  Pupils are equal, round, and reactive to light.    Skin:  Warm.      Recent Labs     09/04/24  0635 09/05/24  1138 09/06/24  0528    141 144   K 3.5 3.5 3.9   * 110* 112*   CO2 19* 20* 19*   BUN 11 10 15   CREATININE 0.9 1.0 1.0   GLUCOSE 105* 82 92   CALCIUM 8.8 9.3 9.0       Recent Labs     09/04/24  0635 09/05/24  0528 09/06/24  0528   WBC 10.8 8.6 15.2*   RBC 2.80* 3.10* 2.96*   HGB 8.9* 9.5* 9.2*   HCT 27.0* 29.4* 28.3*   MCV 96.4 94.8 95.6   MCH 31.8 30.6 31.1   MCHC 33.0 32.3 32.5   RDW 14.0 14.1 14.0    288 338   MPV 11.5 12.1* 11.6       I/O last 3 completed shifts:  In: 1150 [I.V.:1150]  Out: 300 [Urine:300]  No intake/output data recorded.    No results found for this or any previous visit.      SYNOPSIS: 49-year-old female with past medical history that includes seizure, DDD, presented to ER with a fall.  She was found by her boyfriend in the shower with multiple bruises. Unsure if she lost consciousness or had a seizure prior to falling.  She is unsure whether she hit her head.  She was complaining of neck pain.  She has extensive bruising. She does have a history of previous seizures but is not on any medication for seizures.   generated by the epic EMR system/Dragon speech recognition and may contain inherent errors or omissions not intended by the user. Grammatical errors, random word insertions, deletions, pronoun errors and incomplete sentences are occasional consequences of this technology due to software limitations. Not all errors are caught and corrected. If there are questions or concerns about the content of this note or information contained within the body of this dictation they should be addressed directly with the author for clarification.    Electronically signed by Carlos Sevilla MD on 9/6/2024 at 1:21 PM

## 2024-09-06 NOTE — PROGRESS NOTES
[facility]  Nephrology Progress Note    Patient's Name: Natasha Joe    History of Present Illness from 9/1 Consult Note:    \"Natasha oJe is a 49 year old female, with past medical history of heart valve regurgitation and seizures, who presented to the ED on 8/31 s/p a fall at home and concern for seizures. Vital signs on 8/31 include temperature 98.3, RR 18, pulse 114, /88, 98% SPO2 on room air. Lab data on 8/31 includes sodium 148, potassium 3.8, chloride 113, CO2 18, BUN 21, creatinine 1.9, magnesium 2.9, lactic acid 1.1, calcium 10.4, CK 10,288, albumin 4.2, WBC 18.3, Hgb 12.6. Imaging relevant to this note from 8/31 includes a CTA of the chest, which showed no evidence of pulmonary embolism or acute pulmonary abnormality and no evidence of trauma, CT spine wo contrast, which showed no acute fracture of traumatic malalignment of the cervical spine, CT face wo contrast, which showed no acute facial fractures and bifrontal scalp hematomas, CT head wo contrast, which showed no acute intracranial abnormality and bifrontal scalp hematomas, and CT abdomen/pelvis, which showed distended gallbladder with gallbladder wall thickening and 7 mm gallstone versus polyp, intra and extra biliary duct dilatation with 7 mm obstructing calculus in the distal common bile duct, diffuse colonic fecal retention with significant stool burden, no evidence of acute trauma.  Nephrology was consulted to see the patient for SEBASTIAN and rhabdomyolysis. The patient is not known to our service.  At present, patient was seen and examined. She is lethargic, responds somewhat to verbal stimuli but is unable to answer any questions appropriately. Therefore, a review of systems was not obtained. \"    Subjective:    9/6: Patient was seen and examined.  She denies any nausea or vomiting.  She denies any shortness of breath.  She underwent a lap polina yesterday.    PMH:    Past Medical History:   Diagnosis Date    History of heart valve

## 2024-09-06 NOTE — PROGRESS NOTES
Occupational Therapy  OT BEDSIDE TREATMENT NOTE   [facility]  [address], [place]       Date:2024  Patient Name: Natasha Joe  MRN: [id number]  : 1975  Room: [address] [place] [address]     Per OT Eval:    Evaluating OT: Idalmis Asif OTR/L [id number]    Referring Provider: DO Mirian    Specific Provider Orders/Date: OT eval and treat (24)    Diagnosis:  Fall @ home 24- SEBASTIAN & rhabdomyolysis  Pertinent Medical History:  Seizures, DDD & history heart valve regurgitation    Precautions:  Fall Risk, Seizure, 24  Procedure: CHOLECYSTECTOMY LAPAROSCOPIC ROBOTIC XI on 24     Assessment of current deficits    [x] Functional mobility            [x]ADLs           [x] Strength                  []Cognition    [x] Functional transfers          [x] IADLs         [x] Safety Awareness   [x]Endurance    [] Fine Coordination                         [x] Balance      [] Vision/perception   []Sensation      []Gross Motor Coordination             [] ROM           [] Delirium                   [] Motor Control      OT PLAN OF CARE   OT POC based on physician orders, patient diagnosis and results of clinical assessment     Frequency/Duration 1-3 days/wk for 2 weeks PRN   Specific OT Treatment Interventions to include:   * Instruction/training on adapted ADL techniques and AE recommendations to increase functional independence within precautions       * Training on energy conservation strategies, correct breathing pattern and techniques to improve independence/tolerance for self-care routine  * Functional transfer/mobility training/DME recommendations for increased independence, safety, and fall prevention  * Patient/Family education to increase follow through with safety techniques and functional independence  * Recommendation of environmental modifications for increased safety with functional transfers/mobility and ADLs  * Therapeutic exercise to improve

## 2024-09-06 NOTE — DISCHARGE INSTRUCTIONS
PATIENT INSTRUCTIONS  GALL BLADDER SURGERY  (CHOLECYSTECTOMY)    You may be drowsy or lightheaded after receiving sedation or anesthesia.    A responsible person should be with you for the next 24 hours.    Please follow the instructions checked below:    DIET INSTRUCTIONS:  [x]Start with light diet and progress to your normal diet as you feel like eating. If you experience nausea or repeated episodes of vomiting which persist beyond 12-24 hours, notify your doctor.  [x]Other - It is a good idea to eat a high fiber diet and take in plenty of fluids to prevent constipation.  If you do become constipated you may want to take a mild laxative or take ducolax tablets on a daily basis until your bowel habits are regular.  Constipation can be very uncomfortable, along with straining, after recent abdominal surgery.    ACTIVITY INSTRUCTIONS:  [x]Rest today. Increase activity as tolerated    [x]No heavy lifting or strenuous activity until you are seen in the office for follow up    [x]No driving for 1 day or while on narcotics  [x]Other -  You are encouraged to cough and deep breath or use your incentive spirometer if you were given one, every 15-30 minutes when awake.  This will help prevent respiratory complications and low grade fevers post-operatively.  You may want to hug a pillow when coughing and sneezing to add additional support to the surgical area(s) which will decrease pain during these times.  You are encouraged to walk and engage in light activity for the next two weeks.  You should not lift more than 20 pounds during this time frame as it could put you at increased risk for a post-operative hernia.  Twenty pounds is roughly equivalent to a plastic bag of groceries.       WOUND/DRESSING INSTRUCTIONS:  Always ensure you and your care giver clean hands before and after caring for the wound.  [x]May shower      []May bathe      [x]Derma bond dressing-Do not apply lotion, gel, or liquid to wound while the derma

## 2024-09-06 NOTE — PROGRESS NOTES
GENERAL SURGERY  DAILY PROGRESS NOTE  9/6/2024    CHIEF COMPLAINT:  Chief Complaint   Patient presents with    Fall     Fall at home in shower. Found at 1800 by boyfriend. Multiple bruises. On legs and arms. Bruise on Lt eye. Pt ANO x 3 at this moment.        SUBJECTIVE:  Feeling ok this morning, wants to go home. Underwent lap polina yesterday.     OBJECTIVE:  /84   Pulse 88   Temp 98.3 °F (36.8 °C) (Oral)   Resp 18   Ht 1.676 m (5' 6\")   Wt 73 kg (161 lb)   LMP  (Within Years) Comment: 5-6 years ago  SpO2 95%   PF 97 L/min   BMI 25.99 kg/m²     GENERAL:  NAD. A&Ox3.  HEENT: normocephalic   LUNGS:  on room air. No acute respiratory distress  CARDIOVASCULAR: hemodynamically stable  SKIN: warm and dry  ABDOMEN:  Soft, non-distended, appropriately tender, port sites C/D/I . No guarding, rigidity, rebound.  EXT: ROM intact all 4 extremities, no obvious deformities    ASSESSMENT/PLAN:  49 y.o. female with choledocho s/p robo assisted lap polina     Plan  -ok for dc   -leukocytosis reactive to surgery, other labs wnl/continuing to downtrend    -scripts ordered   -follow up in two weeks     John Jon MD  Surgery Resident PGY-4  9/6/2024  9:36 AM

## 2024-09-06 NOTE — PLAN OF CARE
Problem: Safety - Adult  Goal: Free from fall injury  Outcome: Progressing     Problem: Discharge Planning  Goal: Discharge to home or other facility with appropriate resources  Outcome: Progressing     Problem: Confusion  Goal: Confusion, delirium, dementia, or psychosis is improved or at baseline  Description: INTERVENTIONS:  1. Assess for possible contributors to thought disturbance, including medications, impaired vision or hearing, underlying metabolic abnormalities, dehydration, psychiatric diagnoses, and notify attending LIP  2. [place] high risk fall precautions, as indicated  3. Provide frequent short contacts to provide reality reorientation, refocusing and direction  4. Decrease environmental stimuli, including noise as appropriate  5. Monitor and intervene to maintain adequate nutrition, hydration, elimination, sleep and activity  6. If unable to ensure safety without constant attention obtain sitter and review sitter guidelines with assigned personnel  7. Initiate Psychosocial CNS and Spiritual Care consult, as indicated  Outcome: Progressing

## 2024-09-07 VITALS
WEIGHT: 160 LBS | SYSTOLIC BLOOD PRESSURE: 118 MMHG | DIASTOLIC BLOOD PRESSURE: 79 MMHG | HEIGHT: 66 IN | RESPIRATION RATE: 16 BRPM | BODY MASS INDEX: 25.71 KG/M2 | OXYGEN SATURATION: 93 % | TEMPERATURE: 98.3 F | HEART RATE: 78 BPM

## 2024-09-07 LAB
ALBUMIN SERPL-MCNC: 3.1 G/DL (ref 3.5–5.2)
ALP SERPL-CCNC: 100 U/L (ref 35–104)
ALT SERPL-CCNC: 59 U/L (ref 0–32)
ANION GAP SERPL CALCULATED.3IONS-SCNC: 11 MMOL/L (ref 7–16)
AST SERPL-CCNC: 34 U/L (ref 0–31)
BILIRUB SERPL-MCNC: 0.4 MG/DL (ref 0–1.2)
BUN SERPL-MCNC: 10 MG/DL (ref 6–20)
CALCIUM SERPL-MCNC: 8.9 MG/DL (ref 8.6–10.2)
CHLORIDE SERPL-SCNC: 113 MMOL/L (ref 98–107)
CK SERPL-CCNC: 625 U/L (ref 20–180)
CO2 SERPL-SCNC: 20 MMOL/L (ref 22–29)
CREAT SERPL-MCNC: 1 MG/DL (ref 0.5–1)
ERYTHROCYTE [DISTWIDTH] IN BLOOD BY AUTOMATED COUNT: 14.7 % (ref 11.5–15)
GFR, ESTIMATED: 69 ML/MIN/1.73M2
GLUCOSE SERPL-MCNC: 103 MG/DL (ref 74–99)
HCT VFR BLD AUTO: 28.3 % (ref 34–48)
HGB BLD-MCNC: 9.2 G/DL (ref 11.5–15.5)
MAGNESIUM SERPL-MCNC: 2 MG/DL (ref 1.6–2.6)
MCH RBC QN AUTO: 31.6 PG (ref 26–35)
MCHC RBC AUTO-ENTMCNC: 32.5 G/DL (ref 32–34.5)
MCV RBC AUTO: 97.3 FL (ref 80–99.9)
PHOSPHATE SERPL-MCNC: 2.2 MG/DL (ref 2.5–4.5)
PLATELET # BLD AUTO: 343 K/UL (ref 130–450)
PMV BLD AUTO: 11.5 FL (ref 7–12)
POTASSIUM SERPL-SCNC: 3.6 MMOL/L (ref 3.5–5)
PROT SERPL-MCNC: 5.2 G/DL (ref 6.4–8.3)
RBC # BLD AUTO: 2.91 M/UL (ref 3.5–5.5)
SODIUM SERPL-SCNC: 144 MMOL/L (ref 132–146)
WBC OTHER # BLD: 10.8 K/UL (ref 4.5–11.5)

## 2024-09-07 PROCEDURE — 84100 ASSAY OF PHOSPHORUS: CPT

## 2024-09-07 PROCEDURE — 6370000000 HC RX 637 (ALT 250 FOR IP)

## 2024-09-07 PROCEDURE — 85027 COMPLETE CBC AUTOMATED: CPT

## 2024-09-07 PROCEDURE — 6360000002 HC RX W HCPCS

## 2024-09-07 PROCEDURE — 83735 ASSAY OF MAGNESIUM: CPT

## 2024-09-07 PROCEDURE — 36415 COLL VENOUS BLD VENIPUNCTURE: CPT

## 2024-09-07 PROCEDURE — 82550 ASSAY OF CK (CPK): CPT

## 2024-09-07 PROCEDURE — 80053 COMPREHEN METABOLIC PANEL: CPT

## 2024-09-07 PROCEDURE — 2580000003 HC RX 258

## 2024-09-07 PROCEDURE — 99239 HOSP IP/OBS DSCHRG MGMT >30: CPT | Performed by: INTERNAL MEDICINE

## 2024-09-07 RX ORDER — CLONIDINE HYDROCHLORIDE 0.2 MG/1
0.2 TABLET ORAL NIGHTLY
Qty: 60 TABLET | Refills: 3 | Status: SHIPPED | OUTPATIENT
Start: 2024-09-07 | End: 2024-09-07

## 2024-09-07 RX ORDER — LEVETIRACETAM 500 MG/1
500 TABLET ORAL 2 TIMES DAILY
Qty: 60 TABLET | Refills: 3 | Status: SHIPPED | OUTPATIENT
Start: 2024-09-07 | End: 2024-09-07

## 2024-09-07 RX ORDER — LEVETIRACETAM 500 MG/1
500 TABLET ORAL 2 TIMES DAILY
Qty: 60 TABLET | Refills: 3 | Status: SHIPPED | OUTPATIENT
Start: 2024-09-07

## 2024-09-07 RX ORDER — OXYCODONE HYDROCHLORIDE 5 MG/1
5 TABLET ORAL EVERY 6 HOURS PRN
Qty: 12 TABLET | Refills: 0 | Status: SHIPPED | OUTPATIENT
Start: 2024-09-07 | End: 2024-09-14

## 2024-09-07 RX ORDER — CLONIDINE HYDROCHLORIDE 0.2 MG/1
0.2 TABLET ORAL NIGHTLY
Qty: 60 TABLET | Refills: 3 | Status: SHIPPED | OUTPATIENT
Start: 2024-09-07

## 2024-09-07 RX ADMIN — IBUPROFEN 400 MG: 400 TABLET, FILM COATED ORAL at 05:35

## 2024-09-07 RX ADMIN — PREGABALIN 100 MG: 50 CAPSULE ORAL at 09:17

## 2024-09-07 RX ADMIN — TRAMADOL HYDROCHLORIDE 50 MG: 50 TABLET ORAL at 09:17

## 2024-09-07 RX ADMIN — BACLOFEN 20 MG: 10 TABLET ORAL at 07:16

## 2024-09-07 RX ADMIN — POLYETHYLENE GLYCOL 3350 17 G: 17 POWDER, FOR SOLUTION ORAL at 09:19

## 2024-09-07 RX ADMIN — LEVETIRACETAM 500 MG: 100 INJECTION INTRAVENOUS at 09:19

## 2024-09-07 RX ADMIN — LEVOTHYROXINE SODIUM 50 MCG: 0.03 TABLET ORAL at 05:35

## 2024-09-07 RX ADMIN — SODIUM CHLORIDE, PRESERVATIVE FREE 20 ML: 5 INJECTION INTRAVENOUS at 09:16

## 2024-09-07 RX ADMIN — IBUPROFEN 400 MG: 400 TABLET, FILM COATED ORAL at 12:19

## 2024-09-07 RX ADMIN — DOCUSATE SODIUM 100 MG: 100 CAPSULE, LIQUID FILLED ORAL at 09:17

## 2024-09-07 RX ADMIN — DULOXETINE HYDROCHLORIDE 60 MG: 30 CAPSULE, DELAYED RELEASE ORAL at 09:17

## 2024-09-07 RX ADMIN — Medication 250 MG: at 09:21

## 2024-09-07 ASSESSMENT — PAIN DESCRIPTION - DESCRIPTORS
DESCRIPTORS: STABBING
DESCRIPTORS: SHARP
DESCRIPTORS: ACHING;CRUSHING;DISCOMFORT

## 2024-09-07 ASSESSMENT — PAIN DESCRIPTION - LOCATION
LOCATION: NECK;ABDOMEN
LOCATION: NECK;ABDOMEN
LOCATION: NECK
LOCATION: NECK;ABDOMEN
LOCATION: HEAD

## 2024-09-07 ASSESSMENT — PAIN - FUNCTIONAL ASSESSMENT
PAIN_FUNCTIONAL_ASSESSMENT: ACTIVITIES ARE NOT PREVENTED
PAIN_FUNCTIONAL_ASSESSMENT: ACTIVITIES ARE NOT PREVENTED
PAIN_FUNCTIONAL_ASSESSMENT: PREVENTS OR INTERFERES SOME ACTIVE ACTIVITIES AND ADLS

## 2024-09-07 ASSESSMENT — PAIN SCALES - GENERAL
PAINLEVEL_OUTOF10: 7
PAINLEVEL_OUTOF10: 4
PAINLEVEL_OUTOF10: 4
PAINLEVEL_OUTOF10: 6
PAINLEVEL_OUTOF10: 8
PAINLEVEL_OUTOF10: 4
PAINLEVEL_OUTOF10: 6
PAINLEVEL_OUTOF10: 7
PAINLEVEL_OUTOF10: 9

## 2024-09-07 ASSESSMENT — PAIN DESCRIPTION - FREQUENCY
FREQUENCY: CONTINUOUS

## 2024-09-07 ASSESSMENT — PAIN DESCRIPTION - ONSET
ONSET: ON-GOING

## 2024-09-07 ASSESSMENT — PAIN DESCRIPTION - DIRECTION: RADIATING_TOWARDS: HEAD

## 2024-09-07 ASSESSMENT — PAIN DESCRIPTION - ORIENTATION
ORIENTATION: MID

## 2024-09-07 ASSESSMENT — PAIN SCALES - WONG BAKER: WONGBAKER_NUMERICALRESPONSE: NO HURT

## 2024-09-07 NOTE — PLAN OF CARE
Problem: Safety - Adult  Goal: Free from fall injury  Outcome: Progressing     Problem: Pain  Goal: Verbalizes/displays adequate comfort level or baseline comfort level  Outcome: Progressing     Problem: Confusion  Goal: Confusion, delirium, dementia, or psychosis is improved or at baseline  Description: INTERVENTIONS:  1. Assess for possible contributors to thought disturbance, including medications, impaired vision or hearing, underlying metabolic abnormalities, dehydration, psychiatric diagnoses, and notify attending LIP  2. [place] high risk fall precautions, as indicated  3. Provide frequent short contacts to provide reality reorientation, refocusing and direction  4. Decrease environmental stimuli, including noise as appropriate  5. Monitor and intervene to maintain adequate nutrition, hydration, elimination, sleep and activity  6. If unable to ensure safety without constant attention obtain sitter and review sitter guidelines with assigned personnel  7. Initiate Psychosocial CNS and Spiritual Care consult, as indicated  Outcome: Progressing     Problem: ABCDS Injury Assessment  Goal: Absence of physical injury  Outcome: Progressing

## 2024-09-07 NOTE — DISCHARGE SUMMARY
[facility] Hospitalist Physician Discharge Summary       Lorna Choudhury MD  [address] [id number]  [phone]    Follow up in 2 week(s)        Activity level: As tolerated     Dispo: Home      Condition on discharge: Stable     Patient ID:  Natasha Joe  [id number]  49 y.o.  1975    Admit date: 8/31/2024    Discharge date and time:  9/7/2024  2:32 PM    Admission Diagnoses: Principal Problem:    Seizure (HCC)  Active Problems:    Elevated CK    SEBASTIAN (acute kidney injury) (HCC)    Elevated LFTs    Hypothyroidism    Rhabdomyolysis    Fall in (into) shower or empty bathtub, initial encounter    Choledocholithiasis  Resolved Problems:    * No resolved hospital problems. *      Discharge Diagnoses: Principal Problem:    Seizure (HCC)  Active Problems:    Elevated CK    SEBASTIAN (acute kidney injury) (HCC)    Elevated LFTs    Hypothyroidism    Rhabdomyolysis    Fall in (into) shower or empty bathtub, initial encounter    Choledocholithiasis  Resolved Problems:    * No resolved hospital problems. *      Consults:  IP CONSULT TO NEPHROLOGY  IP CONSULT TO SOCIAL WORK  IP CONSULT TO OPHTHALMOLOGY  IP CONSULT TO GENERAL SURGERY  IP CONSULT TO GI    Hospital Course:   Patient Natasha Joe is a 49 y.o. presented with Elevated CK [R74.8]    49-year-old female with past medical history that includes seizure, DDD, presented to ER with a fall.  She was found by her boyfriend in the shower with multiple bruises. Unsure if she lost consciousness or had a seizure prior to falling.  She is unsure whether she hit her head.  She was complaining of neck pain.  She has extensive bruising. She does have a history of previous seizures but is not on any medication for seizures.  Per EMS she was having intermittent confusion.  In the ER, she was found to have SEBASTIAN along with elevated CK at 10,288.  On imaging she was found to have choledocholithiasis General Surgery and GI were consulted and the

## 2024-09-07 NOTE — PROGRESS NOTES
[facility]  PROGRESS NOTE  ATTENDING NOTE    Chief Complaint   Patient presents with    Fall     Fall at home in shower. Found at 1800 by boyfriend. Multiple bruises. On legs and arms. Bruise on Lt eye. Pt ANO x 3 at this moment.      S:  50y/o F with seizures and abdominal pain.  She is being seen by neurology for the seizures.  She states she has not had a seizure in 4-5 months.  She c/o RUQ abdominal pain.    CT a/p reviewed there are stones in the CBD.  GB is dilated and has stones as well.    Doing well postop.  Tolerating diet    /79   Pulse (!) 102   Temp 98.1 °F (36.7 °C) (Oral)   Resp 17   Ht 1.676 m (5' 6\")   Wt 73 kg (161 lb)   LMP  (Within Years) Comment: 5-6 years ago  SpO2 95%   PF 97 L/min   BMI 25.99 kg/m²   Gen: NAD  Abd:  soft, ND, mild incisional TTP  Wound:  c/d/i  Bilateral periorbital ecchymosis.    ASSESSMENT/PLAN:  Choledocholithiasis--s/p ERCP with sphincterotomy and stent placement  --s/p lap robo polina  --ADAT  --ok to discharge from surgery standpoint.          Lorna Choudhury MD, MSc, FACS  9/6/2024  10:29 PM

## 2024-09-07 NOTE — PROGRESS NOTES
[facility]  Nephrology Progress Note    Patient's Name: Natasha Joe    History of Present Illness from 9/1 Consult Note:    \"Natasha Joe is a 49 year old female, with past medical history of heart valve regurgitation and seizures, who presented to the ED on 8/31 s/p a fall at home and concern for seizures. Vital signs on 8/31 include temperature 98.3, RR 18, pulse 114, /88, 98% SPO2 on room air. Lab data on 8/31 includes sodium 148, potassium 3.8, chloride 113, CO2 18, BUN 21, creatinine 1.9, magnesium 2.9, lactic acid 1.1, calcium 10.4, CK 10,288, albumin 4.2, WBC 18.3, Hgb 12.6. Imaging relevant to this note from 8/31 includes a CTA of the chest, which showed no evidence of pulmonary embolism or acute pulmonary abnormality and no evidence of trauma, CT spine wo contrast, which showed no acute fracture of traumatic malalignment of the cervical spine, CT face wo contrast, which showed no acute facial fractures and bifrontal scalp hematomas, CT head wo contrast, which showed no acute intracranial abnormality and bifrontal scalp hematomas, and CT abdomen/pelvis, which showed distended gallbladder with gallbladder wall thickening and 7 mm gallstone versus polyp, intra and extra biliary duct dilatation with 7 mm obstructing calculus in the distal common bile duct, diffuse colonic fecal retention with significant stool burden, no evidence of acute trauma.  Nephrology was consulted to see the patient for SEBASTIAN and rhabdomyolysis. The patient is not known to our service.  At present, patient was seen and examined. She is lethargic, responds somewhat to verbal stimuli but is unable to answer any questions appropriately. Therefore, a review of systems was not obtained. \"    Subjective:    9/7: Patient was seen and examined.  She reports that she feels good.  She notes abdominal pain, but denies any nausea or vomiting.  She is for possible discharge today.    PMH:    Past Medical History:   Diagnosis Date

## 2024-09-09 NOTE — CONSULTS
I stopped in to see the patient and she told me that she would be seeing her eye doctor in October.  She currently is not having any problems with her eyes and did not really want to be checked today.  I explained that I would be happy to come back to see her if she changes her mind otherwise have her follow-up with her ophthalmologist after discharge

## 2024-09-12 ENCOUNTER — TELEPHONE (OUTPATIENT)
Dept: SURGERY | Age: 49
End: 2024-09-12

## 2024-09-12 LAB — SURGICAL PATHOLOGY REPORT: NORMAL

## 2024-09-13 DIAGNOSIS — G89.18 POST-OP PAIN: Primary | ICD-10-CM

## 2024-09-13 RX ORDER — OXYCODONE HYDROCHLORIDE 5 MG/1
5 TABLET ORAL EVERY 6 HOURS PRN
Qty: 28 TABLET | Refills: 0 | Status: SHIPPED | OUTPATIENT
Start: 2024-09-13 | End: 2024-09-20

## 2024-09-23 ENCOUNTER — OFFICE VISIT (OUTPATIENT)
Dept: SURGERY | Age: 49
End: 2024-09-23
Payer: MEDICAID

## 2024-09-23 VITALS
OXYGEN SATURATION: 100 % | HEART RATE: 125 BPM | WEIGHT: 153 LBS | TEMPERATURE: 97.3 F | SYSTOLIC BLOOD PRESSURE: 79 MMHG | DIASTOLIC BLOOD PRESSURE: 55 MMHG | BODY MASS INDEX: 24.69 KG/M2 | RESPIRATION RATE: 16 BRPM

## 2024-09-23 DIAGNOSIS — K80.50 CHOLEDOCHOLITHIASIS: Primary | ICD-10-CM

## 2024-09-23 PROCEDURE — 99212 OFFICE O/P EST SF 10 MIN: CPT | Performed by: SURGERY

## 2024-09-23 PROCEDURE — 99024 POSTOP FOLLOW-UP VISIT: CPT | Performed by: SURGERY

## 2024-10-11 ENCOUNTER — PREP FOR PROCEDURE (OUTPATIENT)
Dept: GASTROENTEROLOGY | Age: 49
End: 2024-10-11

## 2024-10-11 ENCOUNTER — TELEPHONE (OUTPATIENT)
Dept: GASTROENTEROLOGY | Age: 49
End: 2024-10-11

## 2024-10-11 NOTE — TELEPHONE ENCOUNTER
Prior Authorization Form:      DEMOGRAPHICS:                     Patient Name:  Natasha Joe  Patient :  1975            Insurance:  Payor: [facility] FOR [facility] MEDICAID HMO / Plan: [facility] FOR [facility] MEDICAID HMO / Product Type: *No Product type* /   Insurance ID Number:    Payer/Plan Subscr  Sex Relation Sub. Ins. ID Effective Group Num   1. [facility] FOR [facility] * NATASHA JOE 1975 Female Self [id number] 1/1/15                                    [address], ATTN MEDICAL CLAIMS         DIAGNOSIS & PROCEDURE:                       Procedure/Operation: ercp stent removal           CPT Code: 71318    Diagnosis:  choledocholithiasis    ICD10 Code: k80.50    Location:  [facility]    Surgeon:  tomas    SCHEDULING INFORMATION:                          Date: 24    Time: tbd              Anesthesia:  MAC/TIVA                                                       Status:  Outpatient        Special Comments:  na       Electronically signed by OSEAS UPTON LPN on 10/11/2024 at 2:39 PM

## 2024-10-11 NOTE — TELEPHONE ENCOUNTER
Left voice message for pt upcoming procedure on 12-2-24 at [facility] at 7:30 am. Asked for a call back to confirm. Electronically signed by OSEAS UPTON LPN on 10/11/2024 at 3:27 PM

## 2024-10-16 ENCOUNTER — TELEPHONE (OUTPATIENT)
Age: 49
End: 2024-10-16

## 2024-10-16 ENCOUNTER — TELEPHONE (OUTPATIENT)
Dept: ADMINISTRATIVE | Age: 49
End: 2024-10-16

## 2024-10-16 NOTE — TELEPHONE ENCOUNTER
Charles w/patient, wants to know if the surgery will be covered under her plan?  [facility] for you?  Please advise

## 2024-10-16 NOTE — TELEPHONE ENCOUNTER
Patient called the office today regarding insurance coverage for her upcoming procedure. Patient was informed that we will look into it and get back to her once we have an update. Electronically signed by Krystal Vogt MA on 10/16/24 at 11:23 AM EDT

## 2024-11-21 ENCOUNTER — TELEPHONE (OUTPATIENT)
Dept: GASTROENTEROLOGY | Age: 49
End: 2024-11-21

## 2024-11-26 NOTE — TELEPHONE ENCOUNTER
Left patient a voice mail that we had to get a specific auth [place] and it is only covered to a short window of time and not sure if I will be able to get another approval. I asked for her to call office back to see what we can arrange. Electronically signed by OSEAS UPTON LPN on 11/26/2024 at 10:39 AM

## 2024-12-10 RX ORDER — IBUPROFEN 800 MG/1
800 TABLET, FILM COATED ORAL EVERY 8 HOURS PRN
COMMUNITY

## 2024-12-10 RX ORDER — ALBUTEROL SULFATE 90 UG/1
1 INHALANT RESPIRATORY (INHALATION) EVERY 4 HOURS PRN
COMMUNITY

## 2024-12-16 ENCOUNTER — ANESTHESIA EVENT (OUTPATIENT)
Dept: ENDOSCOPY | Age: 49
End: 2024-12-16
Payer: MEDICAID

## 2024-12-16 ENCOUNTER — HOSPITAL ENCOUNTER (OUTPATIENT)
Age: 49
Setting detail: OUTPATIENT SURGERY
Discharge: HOME | End: 2024-12-16
Attending: STUDENT IN AN ORGANIZED HEALTH CARE EDUCATION/TRAINING PROGRAM | Admitting: STUDENT IN AN ORGANIZED HEALTH CARE EDUCATION/TRAINING PROGRAM
Payer: MEDICAID

## 2024-12-16 ENCOUNTER — APPOINTMENT (OUTPATIENT)
Dept: GENERAL RADIOLOGY | Age: 49
End: 2024-12-16
Attending: STUDENT IN AN ORGANIZED HEALTH CARE EDUCATION/TRAINING PROGRAM
Payer: MEDICAID

## 2024-12-16 ENCOUNTER — ANESTHESIA (OUTPATIENT)
Dept: ENDOSCOPY | Age: 49
End: 2024-12-16
Payer: MEDICAID

## 2024-12-16 VITALS
HEART RATE: 96 BPM | SYSTOLIC BLOOD PRESSURE: 136 MMHG | WEIGHT: 150 LBS | DIASTOLIC BLOOD PRESSURE: 89 MMHG | BODY MASS INDEX: 22.22 KG/M2 | HEIGHT: 69 IN | OXYGEN SATURATION: 86 % | TEMPERATURE: 97.6 F | RESPIRATION RATE: 18 BRPM

## 2024-12-16 DIAGNOSIS — R10.84 GENERALIZED ABDOMINAL PAIN: Primary | ICD-10-CM

## 2024-12-16 PROBLEM — Z98.890 HISTORY OF BILIARY DUCT STENT PLACEMENT: Status: ACTIVE | Noted: 2024-12-16

## 2024-12-16 PROCEDURE — 43264 ERCP REMOVE DUCT CALCULI: CPT | Performed by: STUDENT IN AN ORGANIZED HEALTH CARE EDUCATION/TRAINING PROGRAM

## 2024-12-16 PROCEDURE — 2500000003 HC RX 250 WO HCPCS: Performed by: ANESTHESIOLOGY

## 2024-12-16 PROCEDURE — 2709999900 HC NON-CHARGEABLE SUPPLY: Performed by: STUDENT IN AN ORGANIZED HEALTH CARE EDUCATION/TRAINING PROGRAM

## 2024-12-16 PROCEDURE — 3700000001 HC ADD 15 MINUTES (ANESTHESIA): Performed by: STUDENT IN AN ORGANIZED HEALTH CARE EDUCATION/TRAINING PROGRAM

## 2024-12-16 PROCEDURE — 74328 X-RAY BILE DUCT ENDOSCOPY: CPT | Performed by: STUDENT IN AN ORGANIZED HEALTH CARE EDUCATION/TRAINING PROGRAM

## 2024-12-16 PROCEDURE — 2580000003 HC RX 258: Performed by: STUDENT IN AN ORGANIZED HEALTH CARE EDUCATION/TRAINING PROGRAM

## 2024-12-16 PROCEDURE — 3609015200 HC ERCP REMOVE CALCULI/DEBRIS BILIARY/PANCREAS DUCT: Performed by: STUDENT IN AN ORGANIZED HEALTH CARE EDUCATION/TRAINING PROGRAM

## 2024-12-16 PROCEDURE — 3609015000 HC ERCP REMOVE FOREIGN BODY/STENT BILIARY/PANC DUCT: Performed by: STUDENT IN AN ORGANIZED HEALTH CARE EDUCATION/TRAINING PROGRAM

## 2024-12-16 PROCEDURE — 2500000003 HC RX 250 WO HCPCS

## 2024-12-16 PROCEDURE — 7100000010 HC PHASE II RECOVERY - FIRST 15 MIN: Performed by: STUDENT IN AN ORGANIZED HEALTH CARE EDUCATION/TRAINING PROGRAM

## 2024-12-16 PROCEDURE — 2580000003 HC RX 258: Performed by: NURSE ANESTHETIST, CERTIFIED REGISTERED

## 2024-12-16 PROCEDURE — 3700000000 HC ANESTHESIA ATTENDED CARE: Performed by: STUDENT IN AN ORGANIZED HEALTH CARE EDUCATION/TRAINING PROGRAM

## 2024-12-16 PROCEDURE — 6360000002 HC RX W HCPCS: Performed by: NURSE ANESTHETIST, CERTIFIED REGISTERED

## 2024-12-16 PROCEDURE — 74330 X-RAY BILE/PANC ENDOSCOPY: CPT

## 2024-12-16 PROCEDURE — 6370000000 HC RX 637 (ALT 250 FOR IP): Performed by: STUDENT IN AN ORGANIZED HEALTH CARE EDUCATION/TRAINING PROGRAM

## 2024-12-16 PROCEDURE — 2720000010 HC SURG SUPPLY STERILE: Performed by: STUDENT IN AN ORGANIZED HEALTH CARE EDUCATION/TRAINING PROGRAM

## 2024-12-16 PROCEDURE — 2500000003 HC RX 250 WO HCPCS: Performed by: NURSE ANESTHETIST, CERTIFIED REGISTERED

## 2024-12-16 PROCEDURE — 7100000011 HC PHASE II RECOVERY - ADDTL 15 MIN: Performed by: STUDENT IN AN ORGANIZED HEALTH CARE EDUCATION/TRAINING PROGRAM

## 2024-12-16 PROCEDURE — 43275 ERCP REMOVE FORGN BODY DUCT: CPT | Performed by: STUDENT IN AN ORGANIZED HEALTH CARE EDUCATION/TRAINING PROGRAM

## 2024-12-16 RX ORDER — TRAMADOL HYDROCHLORIDE 50 MG/1
50 TABLET ORAL EVERY 8 HOURS PRN
Qty: 6 TABLET | Refills: 0 | Status: SHIPPED | OUTPATIENT
Start: 2024-12-16 | End: 2024-12-18

## 2024-12-16 RX ORDER — SODIUM CHLORIDE 0.9 % (FLUSH) 0.9 %
5-40 SYRINGE (ML) INJECTION PRN
Status: CANCELLED | OUTPATIENT
Start: 2024-12-16

## 2024-12-16 RX ORDER — ONDANSETRON 2 MG/ML
4 INJECTION INTRAMUSCULAR; INTRAVENOUS EVERY 6 HOURS PRN
Status: CANCELLED | OUTPATIENT
Start: 2024-12-16

## 2024-12-16 RX ORDER — HYDROMORPHONE HYDROCHLORIDE 1 MG/ML
0.5 INJECTION, SOLUTION INTRAMUSCULAR; INTRAVENOUS; SUBCUTANEOUS EVERY 5 MIN PRN
Status: DISCONTINUED | OUTPATIENT
Start: 2024-12-16 | End: 2024-12-16 | Stop reason: HOSPADM

## 2024-12-16 RX ORDER — GLYCOPYRROLATE 0.2 MG/ML
INJECTION INTRAMUSCULAR; INTRAVENOUS
Status: DISCONTINUED | OUTPATIENT
Start: 2024-12-16 | End: 2024-12-16 | Stop reason: SDUPTHER

## 2024-12-16 RX ORDER — SODIUM CHLORIDE 9 MG/ML
INJECTION, SOLUTION INTRAVENOUS PRN
Status: CANCELLED | OUTPATIENT
Start: 2024-12-16

## 2024-12-16 RX ORDER — LIDOCAINE HYDROCHLORIDE 20 MG/ML
INJECTION, SOLUTION INTRAVENOUS
Status: DISCONTINUED | OUTPATIENT
Start: 2024-12-16 | End: 2024-12-16 | Stop reason: SDUPTHER

## 2024-12-16 RX ORDER — SODIUM CHLORIDE 9 MG/ML
INJECTION, SOLUTION INTRAVENOUS
Status: DISCONTINUED | OUTPATIENT
Start: 2024-12-16 | End: 2024-12-16 | Stop reason: SDUPTHER

## 2024-12-16 RX ORDER — SODIUM CHLORIDE 0.9 % (FLUSH) 0.9 %
5-40 SYRINGE (ML) INJECTION EVERY 12 HOURS SCHEDULED
Status: DISCONTINUED | OUTPATIENT
Start: 2024-12-16 | End: 2024-12-16 | Stop reason: HOSPADM

## 2024-12-16 RX ORDER — INDOMETHACIN 100 MG
100 SUPPOSITORY, RECTAL RECTAL
Status: DISCONTINUED | OUTPATIENT
Start: 2024-12-16 | End: 2024-12-16 | Stop reason: HOSPADM

## 2024-12-16 RX ORDER — NALOXONE HYDROCHLORIDE 0.4 MG/ML
INJECTION, SOLUTION INTRAMUSCULAR; INTRAVENOUS; SUBCUTANEOUS PRN
Status: DISCONTINUED | OUTPATIENT
Start: 2024-12-16 | End: 2024-12-16 | Stop reason: HOSPADM

## 2024-12-16 RX ORDER — MEPERIDINE HYDROCHLORIDE 25 MG/ML
12.5 INJECTION INTRAMUSCULAR; INTRAVENOUS; SUBCUTANEOUS EVERY 5 MIN PRN
Status: DISCONTINUED | OUTPATIENT
Start: 2024-12-16 | End: 2024-12-16 | Stop reason: HOSPADM

## 2024-12-16 RX ORDER — SODIUM CHLORIDE 9 MG/ML
INJECTION, SOLUTION INTRAVENOUS PRN
Status: DISCONTINUED | OUTPATIENT
Start: 2024-12-16 | End: 2024-12-16 | Stop reason: HOSPADM

## 2024-12-16 RX ORDER — INDOMETHACIN 100 MG
SUPPOSITORY, RECTAL RECTAL PRN
Status: DISCONTINUED | OUTPATIENT
Start: 2024-12-16 | End: 2024-12-16 | Stop reason: ALTCHOICE

## 2024-12-16 RX ORDER — DEXMEDETOMIDINE HYDROCHLORIDE 100 UG/ML
INJECTION, SOLUTION INTRAVENOUS
Status: DISCONTINUED | OUTPATIENT
Start: 2024-12-16 | End: 2024-12-16 | Stop reason: SDUPTHER

## 2024-12-16 RX ORDER — SODIUM CHLORIDE 0.9 % (FLUSH) 0.9 %
5-40 SYRINGE (ML) INJECTION PRN
Status: DISCONTINUED | OUTPATIENT
Start: 2024-12-16 | End: 2024-12-16 | Stop reason: HOSPADM

## 2024-12-16 RX ORDER — PROPOFOL 10 MG/ML
INJECTION, EMULSION INTRAVENOUS
Status: DISCONTINUED | OUTPATIENT
Start: 2024-12-16 | End: 2024-12-16 | Stop reason: SDUPTHER

## 2024-12-16 RX ORDER — SODIUM CHLORIDE, SODIUM LACTATE, POTASSIUM CHLORIDE, CALCIUM CHLORIDE 600; 310; 30; 20 MG/100ML; MG/100ML; MG/100ML; MG/100ML
INJECTION, SOLUTION INTRAVENOUS CONTINUOUS
Status: DISCONTINUED | OUTPATIENT
Start: 2024-12-16 | End: 2024-12-16 | Stop reason: HOSPADM

## 2024-12-16 RX ORDER — ONDANSETRON 4 MG/1
4 TABLET, ORALLY DISINTEGRATING ORAL EVERY 8 HOURS PRN
Status: CANCELLED | OUTPATIENT
Start: 2024-12-16

## 2024-12-16 RX ORDER — SODIUM CHLORIDE 0.9 % (FLUSH) 0.9 %
5-40 SYRINGE (ML) INJECTION EVERY 12 HOURS SCHEDULED
Status: CANCELLED | OUTPATIENT
Start: 2024-12-16

## 2024-12-16 RX ADMIN — DEXMEDETOMIDINE HCL 6 MCG: 100 INJECTION INTRAVENOUS at 12:35

## 2024-12-16 RX ADMIN — PROPOFOL 70 MG: 10 INJECTION, EMULSION INTRAVENOUS at 12:35

## 2024-12-16 RX ADMIN — GLYCOPYRROLATE 0.2 MG: 0.2 INJECTION INTRAMUSCULAR; INTRAVENOUS at 12:34

## 2024-12-16 RX ADMIN — SODIUM CHLORIDE: 9 INJECTION, SOLUTION INTRAVENOUS at 12:30

## 2024-12-16 RX ADMIN — PROPOFOL 50 MCG/KG/MIN: 10 INJECTION, EMULSION INTRAVENOUS at 12:36

## 2024-12-16 RX ADMIN — PROPOFOL 30 MG: 10 INJECTION, EMULSION INTRAVENOUS at 12:45

## 2024-12-16 RX ADMIN — LIDOCAINE HYDROCHLORIDE 60 MG: 20 INJECTION, SOLUTION INTRAVENOUS at 12:35

## 2024-12-16 RX ADMIN — HYDROMORPHONE HYDROCHLORIDE 0.5 MG: 1 INJECTION, SOLUTION INTRAMUSCULAR; INTRAVENOUS; SUBCUTANEOUS at 13:56

## 2024-12-16 RX ADMIN — HYDROMORPHONE HYDROCHLORIDE 0.5 MG: 1 INJECTION, SOLUTION INTRAMUSCULAR; INTRAVENOUS; SUBCUTANEOUS at 13:33

## 2024-12-16 ASSESSMENT — PAIN - FUNCTIONAL ASSESSMENT
PAIN_FUNCTIONAL_ASSESSMENT: PREVENTS OR INTERFERES SOME ACTIVE ACTIVITIES AND ADLS
PAIN_FUNCTIONAL_ASSESSMENT: ACTIVITIES ARE NOT PREVENTED
PAIN_FUNCTIONAL_ASSESSMENT: 0-10
PAIN_FUNCTIONAL_ASSESSMENT: 0-10

## 2024-12-16 ASSESSMENT — PAIN DESCRIPTION - LOCATION
LOCATION: ABDOMEN
LOCATION: ABDOMEN

## 2024-12-16 ASSESSMENT — LIFESTYLE VARIABLES: SMOKING_STATUS: 1

## 2024-12-16 ASSESSMENT — PAIN DESCRIPTION - DESCRIPTORS
DESCRIPTORS: ACHING;DISCOMFORT;SORE
DESCRIPTORS: ACHING;DISCOMFORT;CRUSHING

## 2024-12-16 ASSESSMENT — PAIN SCALES - GENERAL
PAINLEVEL_OUTOF10: 10
PAINLEVEL_OUTOF10: 7

## 2024-12-16 NOTE — H&P
[facility]   Gastroenterology, Hepatology, &  Advanced Endoscopy    Consult       ASSESSMENT AND PLAN:    49y/F w/ history of choledocholithiasis s/p ERCP with stone removal and stent placement as well as interval CCY who presents for stent removal.     PLAN:  ERCP        HISTORY OF PRESENT ILLNESS:      Ms. Natasha Joe is a 49y/F w/ history of choledocholithiasis s/p ERCP with stone removal and stent placement as well as interval CCY who presents for stent removal.     Past Medical History:        Diagnosis Date    History of appendectomy     History of endometrial ablation     History of heart valve regurgitation     History of hernia repair     Hx of fusion of cervical spine     Seizure (HCC)      Past Surgical History:        Procedure Laterality Date    CHOLECYSTECTOMY N/A 9/5/2024    CHOLECYSTECTOMY LAPAROSCOPIC ROBOTIC XI performed by Lorna Choudhury MD at [facility] OR    ERCP N/A 9/3/2024    ENDOSCOPIC RETROGRADE CHOLANGIOPANCREATOGRAPHY STONE REMOVAL performed by Matheus Umana MD at [facility] ENDOSCOPY    ERCP N/A 9/3/2024    ENDOSCOPIC RETROGRADE CHOLANGIOPANCREATOGRAPHY STENT INSERTION performed by Matheus Umana MD at [facility] ENDOSCOPY    ERCP N/A 9/3/2024    ENDOSCOPIC RETROGRADE CHOLANGIOPANCREATOGRAPHY SPHINCTER/PAPILLOTOMY performed by Matheus Umana MD at [facility] ENDOSCOPY     Social History:    TOBACCO:   reports that she has been smoking cigarettes. She has never used smokeless tobacco.  ETOH:   reports that she does not currently use alcohol.  DRUGS:   reports that she does not currently use drugs.  Family History:   History reviewed. No pertinent family history.    No current facility-administered medications for this encounter.    Current Outpatient Medications:     ibuprofen (ADVIL;MOTRIN) 800 MG tablet, Take 1 tablet by mouth every 8 hours as needed for Pain, Disp: , Rfl:     albuterol sulfate HFA (VENTOLIN HFA) 108 (90 Base) MCG/ACT inhaler, Inhale 1 puff into the lungs every 4 hours as needed for

## 2024-12-16 NOTE — ANESTHESIA PRE PROCEDURE
R74.8    SEBASTIAN (acute kidney injury) (HCC) N17.9    Seizure (HCC) R56.9    Elevated LFTs R79.89    Hypothyroidism E03.9    Rhabdomyolysis M62.82    Fall in (into) shower or empty bathtub, initial encounter W18.2XXA    Choledocholithiasis K80.50       Past Medical History:        Diagnosis Date    History of appendectomy     History of endometrial ablation     History of heart valve regurgitation     History of hernia repair     Hx of fusion of cervical spine     Seizure (HCC)        Past Surgical History:        Procedure Laterality Date    CHOLECYSTECTOMY N/A 9/5/2024    CHOLECYSTECTOMY LAPAROSCOPIC ROBOTIC XI performed by Lorna Choudhury MD at [facility]    ERCP N/A 9/3/2024    ENDOSCOPIC RETROGRADE CHOLANGIOPANCREATOGRAPHY STONE REMOVAL performed by Matheus Umana MD at [facility] ENDOSCOPY    ERCP N/A 9/3/2024    ENDOSCOPIC RETROGRADE CHOLANGIOPANCREATOGRAPHY STENT INSERTION performed by Matheus Umana MD at [facility] ENDOSCOPY    ERCP N/A 9/3/2024    ENDOSCOPIC RETROGRADE CHOLANGIOPANCREATOGRAPHY SPHINCTER/PAPILLOTOMY performed by Matheus Umana MD at [facility] ENDOSCOPY       Social History:    Social History     Tobacco Use    Smoking status: Every Day     Types: Cigarettes    Smokeless tobacco: Never   Substance Use Topics    Alcohol use: Not Currently                                Ready to quit: Yes  Counseling given: No      Vital Signs (Current):   Vitals:    12/10/24 1434 12/16/24 1006   BP:  130/63   Pulse:  88   Resp:  19   Temp:  36.2 °C (97.2 °F)   TempSrc:  Temporal   SpO2:  100%   Weight: 68 kg (150 lb) 68 kg (150 lb)   Height: 1.676 m (5' 6\") 1.753 m (5' 9\")                                              BP Readings from Last 3 Encounters:   12/16/24 130/63   09/23/24 (!) 79/55   09/07/24 118/79       NPO Status: Time of last liquid consumption: 0800 (sip with meds)                        Time of last solid consumption: 2200                        Date of last liquid consumption: 12/16/24                        Date

## 2024-12-16 NOTE — PROGRESS NOTES
1500  dr. Umana here to see patient.  Ok to discharge.     
print.    MEDICATION INSTRUCTIONS:    [x] Bring a complete list of your medications, please write the last time you took the medicine, give this list to the nurse in Pre-Op.  [x] Take ONLY the following medications the morning of surgery: Keppra; Lyrica;Cymbalta  [x] Stop all herbal supplements and vitamins 5 days before surgery.   [x] Stop all NSAIDS 7 days before surgery.  [x] Use your inhalers the morning of surgery.  Bring your emergency inhaler with you day of surgery. Use albuterol am of procedure if needed and bring with you to hospital.  [x] Follow physician instructions regarding any blood thinners you may be taking.    WHAT TO EXPECT:    [x] The day of your procedure you will be greeted and checked in by the [facility] .  In addition, you will be registered in the [facility] by a Patient Access Representative. Please bring your photo ID and insurance card. A nurse will greet you in accordance to the time you are needed in the pre-op area to prepare you for surgery. Please do not be discouraged if you are not greeted in the order you arrive as there are many variables that are involved in patient preparation. Your patience is greatly appreciated as you wait for your nurse.  [x] Delays may occur. Staff will make a sincere effort to keep you informed of delays. If any delays occur with your procedure, we apologize ahead of time for your inconvenience as we recognize the value of your time.

## 2024-12-16 NOTE — ANESTHESIA POSTPROCEDURE EVALUATION
Department of Anesthesiology  Postprocedure Note    Patient: Natasha Joe  MRN: [id number]  YOB: 1975  Date of evaluation: 12/16/2024    Procedure Summary       Date: 12/16/24 Room / Location: [address] [id number] / [facility]    Anesthesia Start: 1230 Anesthesia Stop: 1303    Procedures:       ERCP STENT REMOVAL      ENDOSCOPIC RETROGRADE CHOLANGIOPANCREATOGRAPHY STONE REMOVAL Diagnosis:       Choledocholithiasis      (Choledocholithiasis [K80.50])    Surgeons: Matheus Umana MD Responsible Provider: Maximiliano Salomon MD    Anesthesia Type: MAC ASA Status: 3            Anesthesia Type: No value filed.    Erum Phase I: Erum Score: 10    Erum Phase II: Erum Score: 10    Anesthesia Post Evaluation    Patient location during evaluation: PACU  Patient participation: complete - patient participated  Level of consciousness: awake  Pain score: 3  Airway patency: patent  Nausea & Vomiting: no nausea and no vomiting  Cardiovascular status: blood pressure returned to baseline  Respiratory status: acceptable  Hydration status: euvolemic    No notable events documented.

## 2024-12-17 ENCOUNTER — TELEPHONE (OUTPATIENT)
Age: 49
End: 2024-12-17

## 2024-12-17 RX ORDER — ONDANSETRON 4 MG/1
4 TABLET, FILM COATED ORAL 3 TIMES DAILY PRN
Qty: 30 TABLET | Refills: 0 | Status: SHIPPED | OUTPATIENT
Start: 2024-12-17

## 2024-12-19 ENCOUNTER — TELEPHONE (OUTPATIENT)
Dept: GASTROENTEROLOGY | Age: 49
End: 2024-12-19

## 2024-12-19 NOTE — TELEPHONE ENCOUNTER
Patient was reached but unable to get a hold of her. Called alternate contact as well, unable to reach him voice mail was left to have Natasha call the office to schedule a follow up appt at next available with Jessica. This is for follow up ERCP to make sure there are no symptoms. If patient calls back please schedule. Electronically signed by Krystal Vogt MA on 12/19/24 at 11:16 AM EST

## (undated) DEVICE — TISSUE RETRIEVAL SYSTEM: Brand: INZII RETRIEVAL SYSTEM

## (undated) DEVICE — RETRIEVAL BALLOON CATHETER: Brand: EXTRACTOR™ PRO RX

## (undated) DEVICE — BLADELESS OBTURATOR: Brand: WECK VISTA

## (undated) DEVICE — GARMENT,MEDLINE,DVT,INT,CALF,MED, GEN2: Brand: MEDLINE

## (undated) DEVICE — GLOVE ORANGE PI 7   MSG9070

## (undated) DEVICE — SYRINGE MED 10ML LUERLOCK TIP W/O SFTY DISP

## (undated) DEVICE — SYRINGE INFL 60ML DISP ALLIANCE II

## (undated) DEVICE — GLOVE SURG SZ 65 THK91MIL LTX FREE SYN POLYISOPRENE

## (undated) DEVICE — GAUZE,SPONGE,4"X4",8PLY,STRL,LF,10/TRAY: Brand: MEDLINE

## (undated) DEVICE — SYSTEM INJ BILI RAP REFIL CONT

## (undated) DEVICE — SINGLE USE DISTAL COVER MAJ-2315: Brand: SINGLE USE DISTAL COVER

## (undated) DEVICE — STRAP POS MP 30X3 IN HK LOOP CLOSURE FOAM DISP

## (undated) DEVICE — SYRINGE 20ML LL S/C 50

## (undated) DEVICE — DEFENDO AIR WATER SUCTION AND BIOPSY VALVE KIT FOR  OLYMPUS: Brand: DEFENDO AIR/WATER/SUCTION AND BIOPSY VALVE

## (undated) DEVICE — ROUND TIP SCISSORS: Brand: ENDOWRIST

## (undated) DEVICE — CANNULATING SPHINCTEROTOME: Brand: JAGTOME™ REVOLUTION RX

## (undated) DEVICE — CANNULA NSL CO2 SAMPLING AD ORAL W/ O2 DEL CAPNOFLEX

## (undated) DEVICE — CANNULA NSL ORAL AD FOR CAPNOFLEX CO2 O2 AIRLFE

## (undated) DEVICE — MEDIA CONTRAST ISOVUE 300 100ML

## (undated) DEVICE — BILIARY BALLOON DILATATION CATHETER: Brand: CRE™ RX

## (undated) DEVICE — BITEBLOCK 54FR W/ DENT RIM BLOX

## (undated) DEVICE — FENESTRATED BIPOLAR FORCEPS: Brand: ENDOWRIST

## (undated) DEVICE — PLUMEPORT ACTIV LAPAROSCOPIC SMOKE FILTRATION DEVICE: Brand: PLUMEPORT ACTIVE

## (undated) DEVICE — SEAL

## (undated) DEVICE — PROGRASP FORCEPS: Brand: ENDOWRIST

## (undated) DEVICE — PAD PT POS 36 IN SURGYPAD DISP

## (undated) DEVICE — 4-PORT MANIFOLD: Brand: NEPTUNE 2

## (undated) DEVICE — SYSTEM BX CAP BILI RAP EXCHG CAP LOK DEV COMPATIBLE W/ OLY

## (undated) DEVICE — INSUFFLATION NEEDLE TO ESTABLISH PNEUMOPERITONEUM.: Brand: INSUFFLATION NEEDLE

## (undated) DEVICE — [HIGH FLOW INSUFFLATOR,  DO NOT USE IF PACKAGE IS DAMAGED,  KEEP DRY,  KEEP AWAY FROM SUNLIGHT,  PROTECT FROM HEAT AND RADIOACTIVE SOURCES.]: Brand: PNEUMOSURE

## (undated) DEVICE — COLUMN DRAPE

## (undated) DEVICE — MEDIUM-LARGE CLIP APPLIER: Brand: ENDOWRIST

## (undated) DEVICE — ELECTRODE PT RET AD L9FT HI MOIST COND ADH HYDRGEL CORDED

## (undated) DEVICE — PERMANENT CAUTERY HOOK: Brand: ENDOWRIST

## (undated) DEVICE — ROBOTIC: Brand: MEDLINE INDUSTRIES, INC.

## (undated) DEVICE — ARM DRAPE

## (undated) DEVICE — RESCUE COMBO FORCEPS